# Patient Record
Sex: MALE
[De-identification: names, ages, dates, MRNs, and addresses within clinical notes are randomized per-mention and may not be internally consistent; named-entity substitution may affect disease eponyms.]

---

## 2017-07-06 ENCOUNTER — HOSPITAL ENCOUNTER (OUTPATIENT)
Dept: HOSPITAL 31 - C.ER | Age: 52
Setting detail: OBSERVATION
LOS: 1 days | Discharge: HOME | End: 2017-07-07
Attending: INTERNAL MEDICINE | Admitting: INTERNAL MEDICINE
Payer: COMMERCIAL

## 2017-07-06 VITALS — BODY MASS INDEX: 32.8 KG/M2

## 2017-07-06 DIAGNOSIS — R29.810: Primary | ICD-10-CM

## 2017-07-06 DIAGNOSIS — E11.9: ICD-10-CM

## 2017-07-06 DIAGNOSIS — Z79.82: ICD-10-CM

## 2017-07-06 DIAGNOSIS — G51.0: ICD-10-CM

## 2017-07-06 DIAGNOSIS — R20.0: ICD-10-CM

## 2017-07-06 LAB
ALBUMIN SERPL-MCNC: 4.1 G/DL (ref 3.5–5)
ALBUMIN/GLOB SERPL: 1.2 {RATIO} (ref 1–2.1)
ALT SERPL-CCNC: 39 U/L (ref 21–72)
APTT BLD: 34 SECONDS (ref 21–34)
AST SERPL-CCNC: 31 U/L (ref 17–59)
BASOPHILS # BLD AUTO: 0 K/UL (ref 0–0.2)
BASOPHILS NFR BLD: 0.6 % (ref 0–2)
BILIRUB UR-MCNC: NEGATIVE MG/DL
BUN SERPL-MCNC: 15 MG/DL (ref 9–20)
CALCIUM SERPL-MCNC: 9.1 MG/DL (ref 8.6–10.4)
CAOX CRY #/AREA URNS HPF: (no result) /HPF
EOSINOPHIL # BLD AUTO: 0.3 K/UL (ref 0–0.7)
EOSINOPHIL NFR BLD: 3.3 % (ref 0–4)
ERYTHROCYTE [DISTWIDTH] IN BLOOD BY AUTOMATED COUNT: 13.6 % (ref 11.5–14.5)
GFR NON-AFRICAN AMERICAN: > 60
GLUCOSE UR STRIP-MCNC: NORMAL MG/DL
HDLC SERPL-MCNC: 42 MG/DL (ref 30–70)
HGB BLD-MCNC: 14.4 G/DL (ref 12–18)
INR PPP: 1
LDLC SERPL-MCNC: 151 MG/DL (ref 0–129)
LEUKOCYTE ESTERASE UR-ACNC: (no result) LEU/UL
LYMPHOCYTES # BLD AUTO: 2.8 K/UL (ref 1–4.3)
LYMPHOCYTES NFR BLD AUTO: 35.2 % (ref 20–40)
MCH RBC QN AUTO: 27.7 PG (ref 27–31)
MCHC RBC AUTO-ENTMCNC: 32.4 G/DL (ref 33–37)
MCV RBC AUTO: 85.6 FL (ref 80–94)
MONOCYTES # BLD: 0.8 K/UL (ref 0–0.8)
MONOCYTES NFR BLD: 10.7 % (ref 0–10)
NEUTROPHILS # BLD: 3.9 K/UL (ref 1.8–7)
NEUTROPHILS NFR BLD AUTO: 50.2 % (ref 50–75)
NRBC BLD AUTO-RTO: 0 % (ref 0–2)
PH UR STRIP: 5 [PH] (ref 5–8)
PLATELET # BLD: 310 K/UL (ref 130–400)
PMV BLD AUTO: 6.9 FL (ref 7.2–11.7)
PROT UR STRIP-MCNC: NEGATIVE MG/DL
PROTHROMBIN TIME: 10.9 SECONDS (ref 9.7–12.2)
RBC # BLD AUTO: 5.19 MIL/UL (ref 4.4–5.9)
RBC # UR STRIP: NEGATIVE /UL
SP GR UR STRIP: 1.02 (ref 1–1.03)
SQUAMOUS EPITHIAL: 1 /HPF (ref 0–5)
URINE NITRATE: NEGATIVE
UROBILINOGEN UR-MCNC: NORMAL MG/DL (ref 0.2–1)
WBC # BLD AUTO: 7.8 K/UL (ref 4.8–10.8)

## 2017-07-06 PROCEDURE — 97161 PT EVAL LOW COMPLEX 20 MIN: CPT

## 2017-07-06 PROCEDURE — 99285 EMERGENCY DEPT VISIT HI MDM: CPT

## 2017-07-06 PROCEDURE — 85610 PROTHROMBIN TIME: CPT

## 2017-07-06 PROCEDURE — 86850 RBC ANTIBODY SCREEN: CPT

## 2017-07-06 PROCEDURE — 80053 COMPREHEN METABOLIC PANEL: CPT

## 2017-07-06 PROCEDURE — 80061 LIPID PANEL: CPT

## 2017-07-06 PROCEDURE — 97116 GAIT TRAINING THERAPY: CPT

## 2017-07-06 PROCEDURE — 86900 BLOOD TYPING SEROLOGIC ABO: CPT

## 2017-07-06 PROCEDURE — 81001 URINALYSIS AUTO W/SCOPE: CPT

## 2017-07-06 PROCEDURE — 92523 SPEECH SOUND LANG COMPREHEN: CPT

## 2017-07-06 PROCEDURE — 70450 CT HEAD/BRAIN W/O DYE: CPT

## 2017-07-06 PROCEDURE — 85025 COMPLETE CBC W/AUTO DIFF WBC: CPT

## 2017-07-06 PROCEDURE — 84484 ASSAY OF TROPONIN QUANT: CPT

## 2017-07-06 PROCEDURE — 83036 HEMOGLOBIN GLYCOSYLATED A1C: CPT

## 2017-07-06 PROCEDURE — 93005 ELECTROCARDIOGRAM TRACING: CPT

## 2017-07-06 PROCEDURE — 85730 THROMBOPLASTIN TIME PARTIAL: CPT

## 2017-07-06 PROCEDURE — 93880 EXTRACRANIAL BILAT STUDY: CPT

## 2017-07-06 PROCEDURE — 36415 COLL VENOUS BLD VENIPUNCTURE: CPT

## 2017-07-06 PROCEDURE — 71010: CPT

## 2017-07-06 PROCEDURE — 82948 REAGENT STRIP/BLOOD GLUCOSE: CPT

## 2017-07-06 NOTE — C.PDOC
History Of Present Illness





53 yo male, presnts with left sided facial droop and numbness, and left hand 

weakness, that he rpoerts he woke up at 6am with. pt denies fevers, n/v/d, ha, 

or other complaints. no h/o of cva


Time Seen by Provider: 07/06/17 15:13


Chief Complaint (Nursing): Weakness/Neurological Deficit





Past Medical History


Reviewed: Historical Data, Nursing Documentation, Vital Signs


Vital Signs: 


 Last Vital Signs











Temp  98.1 F   07/06/17 23:20


 


Pulse  75   07/06/17 23:20


 


Resp  20   07/06/17 23:20


 


BP  115/72   07/06/17 23:20


 


Pulse Ox  98   07/06/17 23:54














- Medical History


PMH: Back Problems


Family History: States: Unknown Family Hx





- Social History


Hx Tobacco Use: No


Hx Alcohol Use: Yes


Hx Substance Use: No





- Immunization History


Hx Tetanus Toxoid Vaccination: No


Hx Influenza Vaccination: No


Hx Pneumococcal Vaccination: No





Review Of Systems


Except As Marked, All Systems Reviewed And Found Negative.


Neurological: Positive for: Weakness ((+)left sided facial droop)





Physical Exam





- Physical Exam


Appears: Well, No Acute Distress


Skin: Normal Color, Warm, Dry


Eye(s): bilateral: Normal Inspection, PERRL, EOMI


Nose: Normal


Throat: Normal


Neck: Normal


Cardiovascular: Rhythm Regular


Respiratory: Normal Breath Sounds


Gastrointestinal/Abdominal: Normal Exam


Back: Normal Inspection


Extremity: Normal ROM


Neurological/Psych: Oriented x3, Normal Speech, Normal Cognition, Normal 

Cranial Nerves, Normal Motor, Normal Sensation, Other ((+)left sided facial 

droop (-)forehead sparing)





ED Course And Treatment





- Laboratory Results


Result Diagrams: 


 07/06/17 15:43





 07/06/17 15:43


O2 Sat by Pulse Oximetry: 98





NIHSS Stroke Scale





- Date/Time Evaluation Performed


Date Performed: 07/06/17





- How Severe is the Stoke


Level of Consciousness: 0=Alert


LOC to Questions: 0=Both comments correct


LOC to commands: 0=Obeys both correctly


Best Gaze: 0=Normal


Visual: 0=No visual loss


Facial: 3=Complete unilateral paralysis


Motor Arm - Left: 0=No drift


Motor Arm - Right: 0=No drift


Motor Leg - Left: 0=No drift


Motor Leg - Right: 0=No drift


Limb Ataxia: 0=Absent


Sensory: 0=Normal


Best Language: 0=No aphasia


Dysarthia: 0=Normal articulation


Extinction & Inattention (Neglect): 0=Normal, no object


Score: 3


Severity Of Stroke: 1-4= Minor Stroke





rTPA Inclusion/Exclusion





- Refusal of Treatment


Patient Refused Treatment: No





- Inclusion Criteria for Altepase


Patient is 18 years or Older: Yes


The Clinical Diagnosis of Ischemic Stroke That is Causing a Potentially 

Disabling Neurological Deficit: No


Time of Onset is Well Established to be Less Than 270 Minute Before Treatment 

Would Begin: No


Risk/Benefit Discussed With Patient/Family Member Present: Yes





Medical Decision Making


Medical Decision Making: 





highly suspect bells palsy, although pt states he has left arm/hand weakness- r/

o cva vs bells





540: pt reassessed: labs head ct neg. case discussed with dr trejo. accepts to r/

o cva as pt reports left hand weakness as well. treated emprically for bells 

palsy. 





Disposition





- Disposition


Disposition: HOSPITALIZED


Disposition Time: 17:42


Condition: STABLE





- Clinical Impression


Clinical Impression: 


 Left-sided weakness








Decision To Admit





- Pt Status Changed To:


Hospital Disposition Of: Observation





- .


Bed Request Type: Telemetry


Admitting Physician: Vladimir Trejo Jr.


Patient Diagnosis: 


 Left-sided weakness

## 2017-07-06 NOTE — CP.PCM.HP
History of Present Illness





- History of Present Illness


History of Present Illness: 





Medicine Note for Dr. Ventura





CC: left sided weakness 





HPI: 52M with PMHx of Diabetes presents to the ED with left sided weakness. 

Patient reports this morning after breakfast, his friend notified him that his 

the left side of his is drooping. He felt the left side of his face was numb 

with a decrease touch -point discrimination on sensation. He tried to brush his 

teeth after and noticed he was unable to spit out the toothpaste and water. 

Denied any difficulty swallowing, denied any weakness in upper or lower 

extremities, no changes in gait. Patient reports he underwent surgery for an 

abdominal hernia repair on 06/13/17, after the surgery he noticed he started to 

have left sided upper extremity weakness. He on 3 occasions has dropped cups 

that he was holding in his left hand. Denied fever, chills, headache, chest pain

, SOB, abdominal pain, n/v/d/c or urinary symptoms. 





PMHx: Diabetes


PSHx: Ventral hernia (6/13/17


Meds: Metformin 500mg PO daily, Vit D, Omega 3


All: NKDA


SHx: Denied tobacco, alcohol or illicit drug use 


FHx: Mother: Diabetes, Father: HTN





PMD: Juanjo  





Present on Admission





- Present on Admission


Any Indicators Present on Admission: No





Review of Systems





- Constitutional


Constitutional: Headache.  absent: Chills, Daytime Sleepiness





- EENT


Eyes: absent: Blurred Vision, Change in Vision


Ears: absent: Tinnitus


Nose/Mouth/Throat: absent: Nasal Congestion, Nasal Discharge





- Cardiovascular


Cardiovascular: absent: Chest Pain, Chest Pain at Rest, Syncope





- Respiratory


Respiratory: absent: Cough, Dyspnea





- Gastrointestinal


Gastrointestinal: absent: Abdominal Pain, Nausea, Vomiting





- Genitourinary


Genitourinary: absent: Dysuria, Hematuria





- Musculoskeletal


Musculoskeletal: absent: Back Pain





- Integumentary


Integumentary: absent: Wounds





- Neurological


Neurological: Sensory Deficit.  absent: Abnormal Speech, Confusion, Dizziness, 

Numbness, Focal Weakness, Headaches, Loss of Vision, Syncope, Tingling, Tremor, 

Weakness





- Psychiatric


Psychiatric: absent: Anxiety





- Endocrine


Endocrine: absent: Polyuria





- Hematologic/Lymphatic


Hematologic: absent: Easy Bleeding, Easy Bruising





Past Patient History





- Infectious Disease


Hx of Infectious Diseases: None





- Past Social History


Smoking Status: Never Smoked





- PSYCHIATRIC


Hx Substance Use: No





- SURGICAL HISTORY


Hx Surgeries: No





- ANESTHESIA


Hx Anesthesia: No


Hx Anesthesia Reactions: No


Hx Malignant Hyperthermia: No





Meds


Allergies/Adverse Reactions: 


 Allergies











Allergy/AdvReac Type Severity Reaction Status Date / Time


 


No Known Allergies Allergy   Verified 07/06/17 14:53














Physical Exam





- Constitutional


Appears: No Acute Distress





- Head Exam


Head Exam: ATRAUMATIC, NORMAL INSPECTION, NORMOCEPHALIC





- Eye Exam


Eye Exam: EOMI, Normal appearance, PERRL


Pupil Exam: NORMAL ACCOMODATION





- ENT Exam


ENT Exam: Mucous Membranes Moist





- Neck Exam


Neck exam: Positive for: Normal Inspection





- Respiratory Exam


Respiratory Exam: Clear to Auscultation Bilateral, NORMAL BREATHING PATTERN.  

absent: Wheezes





- Cardiovascular Exam


Cardiovascular Exam: REGULAR RHYTHM, RRR, +S1, +S2





- GI/Abdominal Exam


GI & Abdominal Exam: Normal Bowel Sounds, Soft





- Extremities Exam


Extremities exam: Positive for: normal inspection.  Negative for: pedal edema, 

tenderness, pedal pulses present





- Neurological Exam


Neurological exam: Alert, CN II-XII Intact, Normal Gait, Oriented x3, Reflexes 

Normal


Additional comments: 





left sided residual facial droop, normal facial expressions, decrease in 

sensation to light and sharp on left lower face, normal finger to nose test, 

normal rapid alternating movements, muscle strength +5 Bilateral UE, Left lower 

extremity +4/5, right lower extremity +5/5, normal heel to toe, normal gait,





- Skin


Skin Exam: Dry, Intact, Normal Color, Warm





Results





- Vital Signs


Recent Vital Signs: 





 Last Vital Signs











Temp  97.9 F   07/06/17 15:38


 


Pulse  72   07/06/17 18:54


 


Resp  21   07/06/17 18:54


 


BP  106/53 L  07/06/17 18:54


 


Pulse Ox  97   07/06/17 18:54














- Labs


Result Diagrams: 


 07/06/17 15:43





 07/06/17 15:43





Assessment & Plan





- Assessment and Plan (Free Text)


Assessment: 





52M with PMHx of Diabetes presents to the ED with left sided weakness. Patient 

reports this morning after breakfast, his friend notified him that his the left 

side of his is drooping.


Plan: 





Left Sided Facial Droop


* Head CT w/o contrast: No acute intracranial pathology identified.


* Neurology consulted- Dr. Marcelo- help appreciated 


* ASA 81mg PO daily 





Diabetes 


* Metformin 500mg PO daily 


* F/U HgA1C





HLD


* Tri: 218, Cholesterol 213, LDL: 151, HDL 42


* Started on Crestor 10mg PO QHS





Prophylactic Measures


* GI PPX: Protonix 40mg PO daily 


* DVT PPX: SCDs, Heparin 5000 SC Q8H 


* HHD 


* Zofran PRN 


* PT Guzman Ventura,


Loreta SPENCER, PGY-1

## 2017-07-06 NOTE — CT
PROCEDURE:  CT HEAD WITHOUT CONTRAST.



HISTORY:

weakness



COMPARISON:

Noncontrast head CT performed 10/30/16 



TECHNIQUE:

Axial computed tomography images were obtained through the head/brain 

without intravenous contrast.  



Radiation dose:



Total exam DLP = 955.07 mGy-cm.



This CT exam was performed using one or more of the following dose 

reduction techniques: Automated exposure control, adjustment of the 

mA and/or kV according to patient size, and/or use of iterative 

reconstruction technique.



FINDINGS:



HEMORRHAGE:

No intracranial hemorrhage. 



BRAIN:

No mass effect or edema.  The gray-white matter differentiation 

appears intact. Please note that MRI with diffusion imaging is more 

sensitive in the detection of acute ischemic event.



VENTRICLES:

No hydrocephalus. 



CALVARIUM:

Unremarkable.



PARANASAL SINUSES:

Unremarkable as visualized. No significant inflammatory changes.



MASTOID AIR CELLS:

Unremarkable as visualized. No inflammatory changes.



OTHER FINDINGS:

Partial opacification of the left external auditory canal, likely 

cerumen.



IMPRESSION:

No acute intracranial pathology identified.

## 2017-07-06 NOTE — RAD
HISTORY:

weakness  



COMPARISON:

No prior. 



FINDINGS:



LUNGS:

Mild venous congestion.  Right hilar prominence.  Patchy increased 

markings at the left lung base. Upper lobe granulomatous changes.



PLEURA:

No significant pleural effusion identified, no pneumothorax apparent.



CARDIOVASCULAR:

Normal.



OSSEOUS STRUCTURES:

No significant abnormalities.



VISUALIZED UPPER ABDOMEN:

Normal.



OTHER FINDINGS:

None.



IMPRESSION:





Mild venous congestion.  Right hilar prominence.  Patchy increased 

markings at the left lung base. Upper lobe granulomatous changes.

## 2017-07-07 VITALS
DIASTOLIC BLOOD PRESSURE: 70 MMHG | RESPIRATION RATE: 20 BRPM | OXYGEN SATURATION: 95 % | TEMPERATURE: 98.2 F | SYSTOLIC BLOOD PRESSURE: 111 MMHG | HEART RATE: 72 BPM

## 2017-07-07 LAB
ALBUMIN SERPL-MCNC: 4.1 G/DL (ref 3.5–5)
ALBUMIN/GLOB SERPL: 1.2 {RATIO} (ref 1–2.1)
ALT SERPL-CCNC: 45 U/L (ref 21–72)
AST SERPL-CCNC: 37 U/L (ref 17–59)
BASOPHILS # BLD AUTO: 0 K/UL (ref 0–0.2)
BASOPHILS NFR BLD: 0.3 % (ref 0–2)
BUN SERPL-MCNC: 13 MG/DL (ref 9–20)
CALCIUM SERPL-MCNC: 9.5 MG/DL (ref 8.6–10.4)
EOSINOPHIL # BLD AUTO: 0 K/UL (ref 0–0.7)
EOSINOPHIL NFR BLD: 0.2 % (ref 0–4)
ERYTHROCYTE [DISTWIDTH] IN BLOOD BY AUTOMATED COUNT: 13.7 % (ref 11.5–14.5)
GFR NON-AFRICAN AMERICAN: > 60
HGB BLD-MCNC: 14.7 G/DL (ref 12–18)
LYMPHOCYTES # BLD AUTO: 2.4 K/UL (ref 1–4.3)
LYMPHOCYTES NFR BLD AUTO: 23.7 % (ref 20–40)
MCH RBC QN AUTO: 28.3 PG (ref 27–31)
MCHC RBC AUTO-ENTMCNC: 33.2 G/DL (ref 33–37)
MCV RBC AUTO: 85.2 FL (ref 80–94)
MONOCYTES # BLD: 0.6 K/UL (ref 0–0.8)
MONOCYTES NFR BLD: 5.8 % (ref 0–10)
NEUTROPHILS # BLD: 7 K/UL (ref 1.8–7)
NEUTROPHILS NFR BLD AUTO: 70 % (ref 50–75)
NRBC BLD AUTO-RTO: 0.1 % (ref 0–2)
PLATELET # BLD: 299 K/UL (ref 130–400)
PMV BLD AUTO: 7.1 FL (ref 7.2–11.7)
RBC # BLD AUTO: 5.19 MIL/UL (ref 4.4–5.9)
WBC # BLD AUTO: 10 K/UL (ref 4.8–10.8)

## 2017-07-07 NOTE — CP.PCM.DIS
Provider





- Provider


Date of Admission: 


07/06/17 17:03





Attending physician: 


Vladimir Ventura Jr, MD





Primary care physician: 





Dr. Ventura


Consults: 





Dr. Marcelo (neuro)


Time Spent in preparation of Discharge (in minutes): 45





Diagnosis





- Discharge Diagnosis


(1) Bell's palsy


Status: Acute   


Comment: see summary for details   





Hospital Course





- Lab Results


Lab Results: 


 Most Recent Lab Values











WBC  10.0 K/uL (4.8-10.8)   07/07/17  07:05    


 


RBC  5.19 Mil/uL (4.40-5.90)   07/07/17  07:05    


 


Hgb  14.7 g/dL (12.0-18.0)   07/07/17  07:05    


 


Hct  44.2 % (35.0-51.0)   07/07/17  07:05    


 


MCV  85.2 fL (80.0-94.0)   07/07/17  07:05    


 


MCH  28.3 pg (27.0-31.0)   07/07/17  07:05    


 


MCHC  33.2 g/dL (33.0-37.0)   07/07/17  07:05    


 


RDW  13.7 % (11.5-14.5)   07/07/17  07:05    


 


Plt Count  299 K/uL (130-400)   07/07/17  07:05    


 


MPV  7.1 fL (7.2-11.7)  L  07/07/17  07:05    


 


Neut % (Auto)  70.0 % (50.0-75.0)   07/07/17  07:05    


 


Lymph % (Auto)  23.7 % (20.0-40.0)   07/07/17  07:05    


 


Mono % (Auto)  5.8 % (0.0-10.0)   07/07/17  07:05    


 


Eos % (Auto)  0.2 % (0.0-4.0)   07/07/17  07:05    


 


Baso % (Auto)  0.3 % (0.0-2.0)   07/07/17  07:05    


 


Neut #  7.0 K/uL (1.8-7.0)   07/07/17  07:05    


 


Lymph #  2.4 K/uL (1.0-4.3)   07/07/17  07:05    


 


Mono #  0.6 K/uL (0.0-0.8)   07/07/17  07:05    


 


Eos #  0.0 K/uL (0.0-0.7)   07/07/17  07:05    


 


Baso #  0.0 K/uL (0.0-0.2)   07/07/17  07:05    


 


PT  10.9 SECONDS (9.7-12.2)   07/06/17  15:43    


 


INR  1.0   07/06/17  15:43    


 


APTT  34 SECONDS (21-34)   07/06/17  15:43    


 


Sodium  140 mmol/L (132-148)   07/07/17  07:05    


 


Potassium  4.3 mmol/L (3.6-5.2)   07/07/17  07:05    


 


Chloride  101 mmol/L ()   07/07/17  07:05    


 


Carbon Dioxide  27 mmol/L (22-30)   07/07/17  07:05    


 


Anion Gap  17  (10-20)   07/07/17  07:05    


 


BUN  13 mg/dL (9-20)   07/07/17  07:05    


 


Creatinine  0.8 MG/DL (0.8-1.5)   07/07/17  07:05    


 


Est GFR ( Amer)  > 60   07/07/17  07:05    


 


Est GFR (Non-Af Amer)  > 60   07/07/17  07:05    


 


POC Glucose (mg/dL)  87 mg/dL ()   07/07/17  16:11    


 


Random Glucose  106 mg/dL ()   07/07/17  07:05    


 


Hemoglobin A1c  6.4 % (4.2-6.5)   07/06/17  15:43    


 


Calcium  9.5 mg/dl (8.6-10.4)   07/07/17  07:05    


 


Total Bilirubin  0.8 mg/dL (0.2-1.3)   07/07/17  07:05    


 


AST  37 U/L (17-59)   07/07/17  07:05    


 


ALT  45 U/L (21-72)   07/07/17  07:05    


 


Alkaline Phosphatase  51 U/L ()   07/07/17  07:05    


 


Troponin I  < 0.0120 ng/mL (0.00-0.120)   07/06/17  15:43    


 


Total Protein  7.4 g/dL (6.3-8.3)   07/07/17  07:05    


 


Albumin  4.1 g/dL (3.5-5.0)   07/07/17  07:05    


 


Globulin  3.3 gm/dL (2.2-3.9)   07/07/17  07:05    


 


Albumin/Globulin Ratio  1.2  (1.0-2.1)   07/07/17  07:05    


 


Triglycerides  218 mg/dL (0-149)  H  07/06/17  15:43    


 


Cholesterol  213 mg/dL (0-199)  H  07/06/17  15:43    


 


LDL Cholesterol Direct  151 mg/dL (0-129)  H  07/06/17  15:43    


 


HDL Cholesterol  42 mg/dL (30-70)   07/06/17  15:43    


 


Urine Color  Yellow  (YELLOW)   07/06/17  15:43    


 


Urine Clarity  Clear  (Clear)   07/06/17  15:43    


 


Urine pH  5.0  (5.0-8.0)   07/06/17  15:43    


 


Ur Specific Gravity  1.021  (1.003-1.030)   07/06/17  15:43    


 


Urine Protein  Negative mg/dL (NEGATIVE)   07/06/17  15:43    


 


Urine Glucose (UA)  Normal mg/dL (Normal)   07/06/17  15:43    


 


Urine Ketones  Negative mg/dL (NEGATIVE)   07/06/17  15:43    


 


Urine Blood  Negative  (NEGATIVE)   07/06/17  15:43    


 


Urine Nitrate  Negative  (NEGATIVE)   07/06/17  15:43    


 


Urine Bilirubin  Negative  (NEGATIVE)   07/06/17  15:43    


 


Urine Urobilinogen  Normal mg/dL (0.2-1.0)   07/06/17  15:43    


 


Ur Leukocyte Esterase  Neg Maya/uL (Negative)   07/06/17  15:43    


 


Urine WBC (Auto)  1 /hpf (0-5)   07/06/17  15:43    


 


Urine RBC (Auto)  1 /hpf (0-3)   07/06/17  15:43    


 


Ur Squamous Epith Cells  1 /hpf (0-5)   07/06/17  15:43    


 


Calcium Oxalate Crystal  Rare /hpf (<OCC)   07/06/17  15:43    


 


Blood Type  O POSITIVE   07/06/17  15:43    


 


Antibody Screen  Negative   07/06/17  15:43    














- Hospital Course


Hospital Course: 


"CC: left sided weakness 





HPI: 52M with PMHx of Diabetes presents to the ED with left sided weakness. 

Patient reports this morning after breakfast, his friend notified him that his 

the left side of his is drooping. He felt the left side of his face was numb 

with a decrease touch -point discrimination on sensation. He tried to brush his 

teeth after and noticed he was unable to spit out the toothpaste and water. 

Denied any difficulty swallowing, denied any weakness in upper or lower 

extremities, no changes in gait. Patient reports he underwent surgery for an 

abdominal hernia repair on 06/13/17, after the surgery he noticed he started to 

have left sided upper extremity weakness. He on 3 occasions has dropped cups 

that he was holding in his left hand. Denied fever, chills, headache, chest pain

, SOB, abdominal pain, n/v/d/c or urinary symptoms."


In ED patient had a head CT w/o contrast: No acute intracranial pathology 

identified. Aspirin 81 mg PO daily given. Metformin continued for diabetes. 

Patient had 5/5 strength in extremities. His weakness decreased in the face. 

Patient to follow up with occupational therapy for Bell's Palsy. Patient 

discharged as per Dr. Ventura. This is a summary of the hospital course. Please 

see chart for full details.  





Discharge Exam





- Head Exam


Head Exam: ATRAUMATIC, NORMAL INSPECTION, NORMOCEPHALIC


Additional comments: 





left side of lips slightly droops





- Eye Exam


Eye Exam: EOMI, Normal appearance, PERRL


Additional comments: 





left eyelid slightly drooping





- ENT Exam


ENT Exam: Mucous Membranes Moist





- Neck Exam


Neck exam: Full Rom





- Respiratory Exam


Respiratory Exam: Clear to PA & Lateral, NORMAL BREATHING PATTERN, 

UNREMARKABLE.  absent: Rales, Rhonchi, Wheezes, Respiratory Distress, Stridor





- Cardiovascular Exam


Cardiovascular Exam: REGULAR RHYTHM, RRR





- GI/Abdominal Exam


GI & Abdominal Exam: Normal Bowel Sounds.  absent: Distended, Firm, Guarding





- Extremities Exam


Extremities exam: full ROM, normal inspection





- Back Exam


Back exam: NORMAL INSPECTION





- Neurological Exam


Neurological exam: Alert, Normal Gait, Oriented x3





- Psychiatric Exam


Psychiatric exam: Normal Affect, Normal Mood





- Skin


Skin Exam: Intact, Normal Color, Warm





Discharge Plan





- Discharge Medications


Prescriptions: 


Aspirin [Ecotrin] 81 mg PO DAILY #30 tablet.


Methylprednisolone [Medrol Dose Pack (21 tabs)] 4 mg PO DAILY #21 mg





- Follow Up Plan


Condition: STABLE


Disposition: HOME/ ROUTINE


Instructions:  Aspirin (By mouth), Methylprednisolone (By mouth), Mays Palsy (DC

), Heart Healthy Diet (DC)


Additional Instructions: 


Patient cleared for discharge per Dr. Ventura. Patient to continue home 

medications and start medications below:


Medrol Dose pack - follow package instructions


Aspirin 81 mg once daily


occupational therapy for bell's palsy. 


Return Immediately to Emergency Room if symptoms return or worsen. Instructions 

discussed with patient who understands. 


Referrals: 


Rodriguez Marcelo MD [Staff Provider] -  ([phone])


Vladimir Ventura Jr., MD [Medical Doctor] -

## 2017-07-07 NOTE — CARD
--------------- APPROVED REPORT --------------





EKG Measurement

Heart [id number]

[id number]

[id number]

[id number]

[id number]



<Conclusion>

Normal sinus rhythm

Normal ECG

## 2017-07-10 NOTE — VASCLAB
PROCEDURE:  



HISTORY:

TIA



COMPARISON:

None available. 



TECHNIQUE:

Grayscale and duplex Doppler evaluation of the cervical carotid and 

vertebral arteries were performed. The common carotid, carotid 

bifurcations and cervical Internal Carotid Artery (ICA) and proximal 

External Carotid Artery (ECA) were evaluated.  The vertebral arteries 

were evaluated for gross patency and flow direction. 



Report prepared by  Shayne Edwards, BS, RVT



FINDINGS:



RIGHT CAROTID ARTERIES:

1. Common Carotid Artery: No significant focal plaque formation of 

the right common carotid artery. Maximum Peak Systolic velocity: 92 

cm/sec: End-diastolic velocity 24 cm/sec.



2. Carotid Bifurcation:  plaque formation. Maximum Peak Systolic 

velocity: 87 cm/sec: End-diastolic velocity 23 cm/sec.  



3. Internal Carotid Artery:    Plaque description:   



3.1. Proximal Segment: Peak systolic velocity 70 cm/sec: 

End-diastolic velocity  30 cm/sec - % stenosis  0-15%



3.2. Middle Segment:    Peak systolic velocity 91 cm/sec: 

End-diastolic velocity  38  cm/sec - % stenosis 0-15%



3.3. Distal Segment:     Peak systolic velocity 69 cm/sec: 

End-diastolic velocity  27  cm/sec - % stenosis 0-15%



4. External Carotid Artery: No significant focal plaque formation. 

Peak systolic velocity 92 cm/sec



5. ICA/CCA Ratio: 1.0



LEFT CAROTID ARTERIES:

1. Common Carotid Artery: No significant focal plaque formation of 

the left common carotid artery. Maximum Peak Systolic velocity: 106 

cm/sec: End-diastolic velocity 33 cm/sec.



2. Carotid Bifurcation:  plaque formation. Maximum Peak Systolic 

velocity: 101 cm/sec: End-diastolic velocity 32 cm/sec.



3. Internal Carotid Artery:      Plaque description:  



3.1. Proximal Segment: Peak systolic velocity 81 cm/sec: 

End-diastolic velocity 32 cm/sec - % stenosis 0-15%



3.2. Middle Segment:    Peak systolic velocity 81 cm/sec: 

End-diastolic velocity 37 cm/sec - % stenosis 0-15%



3.3. Distal Segment:     Peak systolic velocity 53 cm/sec: 

End-diastolic velocity 19 cm/sec - % stenosis 0-15%



4. External Carotid Artery: No significant focal plaque formation. 

Peak systolic velocity 108 cm/sec



5. ICA/CCA Ratio: 1.0



VERTEBRAL ARTERIES:

1. Right Vertebral Artery: The right vertebral artery flow direction 

is  antegrade.



2. Left Vertebral Artery:   The left vertebral artery flow direction 

is antegrade.



OTHER FINDINGS:

1. Right Brachial Blood pressure: 142 mmHg.



2. Left Brachial Blood pressure: 140 mmHg.



IMPRESSION:

RIGHT:  Duplex scan does not suggest hemodynamically significant 

stenosis of the right extracranial carotid arteries.  



LEFT:  Duplex scan does not suggest hemodynamically significant 

stenosis of the left extracranial carotid arteries.

## 2018-02-23 ENCOUNTER — HOSPITAL ENCOUNTER (EMERGENCY)
Dept: HOSPITAL 31 - C.ER | Age: 53
Discharge: HOME | End: 2018-02-23
Payer: COMMERCIAL

## 2018-02-23 VITALS
HEART RATE: 61 BPM | RESPIRATION RATE: 18 BRPM | TEMPERATURE: 99.6 F | OXYGEN SATURATION: 97 % | DIASTOLIC BLOOD PRESSURE: 73 MMHG | SYSTOLIC BLOOD PRESSURE: 118 MMHG

## 2018-02-23 VITALS — BODY MASS INDEX: 32.8 KG/M2

## 2018-02-23 DIAGNOSIS — J32.9: Primary | ICD-10-CM

## 2018-02-23 NOTE — C.PDOC
History Of Present Illness


52 yo male w/o significant PMHx come in for evaluation of subjective fever, 

chills, bodyaches, mild headache for past 3 days associated with nasal 

congestion and productive cough with clear sputum. Pt reports, since yesterday 

, developed frontal headache, " unable to breath because of nasal congestion", 

Left earache. Otherwise, pt denies severe headache, dizziness, drooling, neck 

pain, CP, SOB, dyspnea, palpitation, wheezing, abd. pain, V/D, back pain, UTI sx

, denies known sick contact or recent  travel. Ambulate to ED for evaluation, 

not in any apparent distress.


Time Seen by Provider: 02/23/18 11:47


Chief Complaint (Nursing): Flu-like Symptoms


History Per: Patient


Onset/Duration Of Symptoms: Gradual





Past Medical History


Reviewed: Historical Data, Nursing Documentation, Vital Signs


Vital Signs: 


 Last Vital Signs











Temp  99.6 F   02/23/18 11:42


 


Pulse  61   02/23/18 11:42


 


Resp  18   02/23/18 11:42


 


BP  118/73   02/23/18 11:42


 


Pulse Ox  97   02/23/18 12:07














- Medical History


PMH: Back Problems


Surgical History: No Surg Hx


Family History: States: No Known Family Hx





- Social History


Hx Tobacco Use: No


Hx Alcohol Use: Yes


Hx Substance Use: No





- Immunization History


Hx Tetanus Toxoid Vaccination: No


Hx Influenza Vaccination: No


Hx Pneumococcal Vaccination: No





Review Of Systems


Except As Marked, All Systems Reviewed And Found Negative.


Constitutional: Positive for: Fever, Chills, Malaise


ENT: Positive for: Ear Pain, Nose Discharge, Nose Congestion, Throat Pain, 

Throat Swelling.  Negative for: Ear Discharge


Cardiovascular: Negative for: Chest Pain, Palpitations, Edema, Light Headedness


Respiratory: Positive for: Cough.  Negative for: Shortness of Breath, Wheezing


Gastrointestinal: Negative for: Nausea, Vomiting, Abdominal Pain, Diarrhea


Genitourinary: Negative for: Dysuria, Frequency, Incontinence


Musculoskeletal: Negative for: Neck Pain, Back Pain


Skin: Negative for: Rash


Neurological: Negative for: Altered Mental Status, Headache, Dizziness





Physical Exam





- Physical Exam


Appears: Well, Non-toxic, No Acute Distress


Skin: Normal Color, Warm, Dry, No Rash


Head: Normacephalic


Eye(s): bilateral: PERRL


Ear(s): Left: Other (mild ear canal erythema, cerumen, unabe to visualise TM), 

Right: Normal


Nose: No Flaring, Discharge (scant clear rhinorrhea B/L), Other (mild b/L 

maxillary and frontal tenderness. No edema, no erythema.)


Oral Mucosa: Moist, No Drooling


Tongue: Normal Appearing


Lips: Normal Appearing


Throat: Erythema (mod B/L), No Exudate, No Drooling, Other (uvula midline)


Neck: Trachea Midline, Supple, Other ((-) meningeal sign)


Cardiovascular: Rhythm Regular


Respiratory: No Decreased Breath Sounds, No Accessory Muscle Use, No Stridor, 

No Wheezing


Gastrointestinal/Abdominal: Soft, No Tenderness, No Distention, No Guarding


Extremity: Normal ROM, No Deformity, No Swelling


Neurological/Psych: Oriented x3, Normal Speech





ED Course And Treatment


O2 Sat by Pulse Oximetry: 97


Pulse Ox Interpretation: Normal


Progress Note: On re-eval, pt is Afebrile, hemodynamicaly stable.  Non-toxic, 

tolerate Po well in ED.  PUlseOx 97% RA.  neck: Supple.  ENT: exam c/w 

sinusitis.  Lungs: CTA B/L, BS equal B/L.  Abd: benign.  Back: (-) CVA 

tenderness.  Neurologicaly intact.  Pt advised on course of ds.  ref. to f/u 

with PMD in 1-2 days for re-eavl.  return to ED if any worsening or new changes.





Disposition


Counseled Patient/Family Regarding: Studies Performed, Diagnosis, Need For 

Followup, Rx Given





- Disposition


Referrals: 


Radha Marshall [Staff Provider] - 


Disposition: HOME/ ROUTINE


Disposition Time: 12:31


Condition: STABLE


Additional Instructions: 


ENCOURAGE FLUIDS


TYLENOL AND/OR IBUPROFEN FOR PAIN AND FEVER


TAKE MEDICATION AS PRESCRIBED


FOLLOW UP WITH PMD, ENT IN 2-3 DAYS FOR RE-EVALUATION.


RETURN TO ED IF ANY WORSENING OR NEW CHANGES.


Prescriptions: 


Amoxicillin/Clavulanate [Augmentin 875 MG-125 MG] 1 tab PO BID #14 tab


Benzonatate [Tessalon Perle] 100 mg PO TID #14 capsule


Prednisone [Deltasone] 40 mg PO DAILY #6 tablet


Instructions:  Sinusitis in Adults


Forms:  [facility] (English), Work Excuse


Print Language: Icelandic





- Clinical Impression


Clinical Impression: 


 Sinusitis

## 2019-04-05 ENCOUNTER — HOSPITAL ENCOUNTER (INPATIENT)
Dept: HOSPITAL 31 - C.ER | Age: 54
LOS: 4 days | Discharge: HOME | DRG: 392 | End: 2019-04-09
Attending: INTERNAL MEDICINE | Admitting: INTERNAL MEDICINE
Payer: COMMERCIAL

## 2019-04-05 VITALS — BODY MASS INDEX: 32.8 KG/M2

## 2019-04-05 DIAGNOSIS — Z87.11: ICD-10-CM

## 2019-04-05 DIAGNOSIS — K52.9: Primary | ICD-10-CM

## 2019-04-05 DIAGNOSIS — K21.0: ICD-10-CM

## 2019-04-05 DIAGNOSIS — G43.919: ICD-10-CM

## 2019-04-05 DIAGNOSIS — K29.00: ICD-10-CM

## 2019-04-05 DIAGNOSIS — K57.90: ICD-10-CM

## 2019-04-05 DIAGNOSIS — K64.4: ICD-10-CM

## 2019-04-05 DIAGNOSIS — E11.9: ICD-10-CM

## 2019-04-05 DIAGNOSIS — M54.5: ICD-10-CM

## 2019-04-05 DIAGNOSIS — E86.0: ICD-10-CM

## 2019-04-05 DIAGNOSIS — G89.29: ICD-10-CM

## 2019-04-05 DIAGNOSIS — K64.8: ICD-10-CM

## 2019-04-05 DIAGNOSIS — Z79.899: ICD-10-CM

## 2019-04-05 DIAGNOSIS — B96.81: ICD-10-CM

## 2019-04-05 LAB
ALBUMIN SERPL-MCNC: 4.8 G/DL (ref 3.5–5)
ALBUMIN/GLOB SERPL: 1.7 {RATIO} (ref 1–2.1)
ALT SERPL-CCNC: 26 U/L (ref 21–72)
AST SERPL-CCNC: 40 U/L (ref 17–59)
BASOPHILS # BLD AUTO: 0 K/UL (ref 0–0.2)
BASOPHILS NFR BLD: 0.1 % (ref 0–2)
BILIRUB UR-MCNC: NEGATIVE MG/DL
BUN SERPL-MCNC: 12 MG/DL (ref 9–20)
CALCIUM SERPL-MCNC: 9.3 MG/DL (ref 8.6–10.4)
EOSINOPHIL # BLD AUTO: 0.1 K/UL (ref 0–0.7)
EOSINOPHIL NFR BLD: 0.5 % (ref 0–4)
EOSINOPHIL NFR BLD: 1 % (ref 0–4)
ERYTHROCYTE [DISTWIDTH] IN BLOOD BY AUTOMATED COUNT: 13.8 % (ref 11.5–14.5)
GFR NON-AFRICAN AMERICAN: > 60
GLUCOSE UR STRIP-MCNC: NORMAL MG/DL
HGB BLD-MCNC: 16.3 G/DL (ref 12–18)
LEUKOCYTE ESTERASE UR-ACNC: (no result) LEU/UL
LIPASE: 104 U/L (ref 23–300)
LYMPHOCYTE: 8 % (ref 20–40)
LYMPHOCYTES # BLD AUTO: 1 K/UL (ref 1–4.3)
LYMPHOCYTES NFR BLD AUTO: 8.5 % (ref 20–40)
MCH RBC QN AUTO: 28.7 PG (ref 27–31)
MCHC RBC AUTO-ENTMCNC: 32.8 G/DL (ref 33–37)
MCV RBC AUTO: 87.4 FL (ref 80–94)
MONOCYTE: 4 % (ref 0–10)
MONOCYTES # BLD: 0.7 K/UL (ref 0–0.8)
MONOCYTES NFR BLD: 5.4 % (ref 0–10)
NEUTROPHILS # BLD: 10.3 K/UL (ref 1.8–7)
NEUTROPHILS NFR BLD AUTO: 85.5 % (ref 50–75)
NEUTROPHILS NFR BLD AUTO: 87 % (ref 50–75)
NRBC BLD AUTO-RTO: 0.1 % (ref 0–2)
PH UR STRIP: 8 [PH] (ref 5–8)
PLATELET # BLD EST: NORMAL 10*3/UL
PLATELET # BLD: 309 K/UL (ref 130–400)
PMV BLD AUTO: 7.2 FL (ref 7.2–11.7)
PROT UR STRIP-MCNC: (no result) MG/DL
RBC # BLD AUTO: 5.69 MIL/UL (ref 4.4–5.9)
RBC # UR STRIP: NEGATIVE /UL
SP GR UR STRIP: 1.02 (ref 1–1.03)
SQUAMOUS EPITHIAL: 5 /HPF (ref 0–5)
TOTAL CELLS COUNTED BLD: 100
UROBILINOGEN UR-MCNC: NORMAL MG/DL (ref 0.2–1)
WBC # BLD AUTO: 12.1 K/UL (ref 4.8–10.8)

## 2019-04-05 RX ADMIN — WATER SCH MLS/HR: 1 INJECTION INTRAMUSCULAR; INTRAVENOUS; SUBCUTANEOUS at 21:20

## 2019-04-05 NOTE — CP.PCM.HP
Present on Admission





- Present on Admission


Any Indicators Present on Admission: No





Past Patient History





- Infectious Disease


Hx of Infectious Diseases: None





- Past Medical History & Family History


Past Medical History?: Yes





- Past Social History


Smoking Status: Never Smoked





- ENDOCRINE/METABOLIC


Hx Diabetes Mellitus Type 2: Yes





- MUSCULOSKELETAL/RHEUMATOLOGICAL


Hx Back Pain: Yes


Hx Falls: No





- PSYCHIATRIC


Hx Substance Use: No





- SURGICAL HISTORY


Hx Surgeries: Yes


Hx Herniorrhaphy: Yes (X1)





- ANESTHESIA


Hx Anesthesia: Yes


Hx Anesthesia Reactions: No


Hx Malignant Hyperthermia: No





Meds


Allergies/Adverse Reactions: 


                                    Allergies











Allergy/AdvReac Type Severity Reaction Status Date / Time


 


No Known Allergies Allergy   Verified 04/05/19 09:57














Results





- Vital Signs


Recent Vital Signs: 





                                Last Vital Signs











Temp  98.6 F   04/05/19 17:22


 


Pulse  76   04/05/19 17:22


 


Resp  18   04/05/19 17:22


 


BP  112/74   04/05/19 17:22


 


Pulse Ox  100   04/05/19 18:48














- Labs


Result Diagrams: 


                                 04/05/19 11:05





                                 04/05/19 11:05


Labs: 





                         Laboratory Results - last 24 hr











  04/05/19 04/05/19 04/05/19





  11:05 11:05 11:05


 


WBC  12.1 H  


 


RBC  5.69  


 


Hgb  16.3  


 


Hct  49.7  


 


MCV  87.4  D  


 


MCH  28.7  


 


MCHC  32.8 L  


 


RDW  13.8  


 


Plt Count  309  


 


MPV  7.2  


 


Neut % (Auto)  85.5 H  


 


Lymph % (Auto)  8.5 L  


 


Mono % (Auto)  5.4  


 


Eos % (Auto)  0.5  


 


Baso % (Auto)  0.1  


 


Neut # (Auto)  10.3 H  


 


Lymph # (Auto)  1.0  


 


Mono # (Auto)  0.7  


 


Eos # (Auto)  0.1  


 


Baso # (Auto)  0.0  


 


Neutrophils % (Manual)  87 H  


 


Lymphocytes % (Manual)  8 L  


 


Monocytes % (Manual)  4  


 


Eosinophils % (Manual)  1  


 


Platelet Estimate  Normal  


 


Sodium   134 


 


Potassium   4.8 


 


Chloride   98 


 


Carbon Dioxide   28 


 


Anion Gap   13 


 


BUN   12 


 


Creatinine   1.2 


 


Est GFR ( Amer)   > 60 


 


Est GFR (Non-Af Amer)   > 60 


 


Random Glucose   101 


 


Calcium   9.3 


 


Total Bilirubin   0.8 


 


AST   40 


 


ALT   26 


 


Alkaline Phosphatase   74 


 


Total Protein   7.6 


 


Albumin   4.8 


 


Globulin   2.8 


 


Albumin/Globulin Ratio   1.7 


 


Lipase   104 


 


Urine Color    Yellow


 


Urine Clarity    Hazy


 


Urine pH    8.0


 


Ur Specific Gravity    1.021


 


Urine Protein    1+ H


 


Urine Glucose (UA)    Normal


 


Urine Ketones    1+ H


 


Urine Blood    Negative


 


Urine Nitrate    Negative


 


Urine Bilirubin    Negative


 


Urine Urobilinogen    Normal


 


Ur Leukocyte Esterase    Neg


 


Urine WBC (Auto)    2


 


Urine RBC (Auto)    < 1


 


Ur Squamous Epith Cells    5

## 2019-04-05 NOTE — C.PDOC
History Of Present Illness


54 year old male presents to the ED for evaluation of abdominal pain, nausea, 

vomiting and diarrhea which has been intermittent for two months. Patient states

his symptoms worsened yesterday, prompting visit. Patient denies fever, chills, 

back pain, or any urinary complaints at this time.  


Time Seen by Provider: 04/05/19 11:11


Chief Complaint (Nursing): Abdominal Pain


History Per: Patient


History/Exam Limitations: no limitations


Onset/Duration Of Symptoms: Intermittent Episodes, Other (two months )


Current Symptoms Are (Timing): Worse


Location Of Pain/Discomfort: Diffuse


Quality Of Discomfort: "Pain"


Associated Symptoms: Nausea, Vomiting, Diarrhea.  denies: Fever, Chills, Back 

Pain





Past Medical History


Reviewed: Historical Data, Nursing Documentation, Vital Signs


Vital Signs: 





                                Last Vital Signs











Temp  98.8 F   04/05/19 14:09


 


Pulse  81   04/05/19 14:09


 


Resp  18   04/05/19 14:09


 


BP  108/71   04/05/19 14:09


 


Pulse Ox  100   04/05/19 14:09














- Medical History


PMH: Back Problems


Surgical History: No Surg Hx


Family History: States: Unknown Family Hx





- Social History


Hx Tobacco Use: No


Hx Alcohol Use: No


Hx Substance Use: No





- Immunization History


Hx Tetanus Toxoid Vaccination: No


Hx Influenza Vaccination: No


Hx Pneumococcal Vaccination: No





Review Of Systems


Constitutional: Negative for: Fever, Chills


Gastrointestinal: Positive for: Nausea, Vomiting, Abdominal Pain, Diarrhea


Musculoskeletal: Negative for: Back Pain





Physical Exam





- Physical Exam


Appears: Non-toxic, No Acute Distress


Skin: Normal Color, Warm, Dry


Head: Atraumatic, Normacephalic


Eye(s): bilateral: Normal Inspection


Oral Mucosa: Moist


Neck: Supple


Chest: Symmetrical, No Deformity, No Tenderness


Cardiovascular: Rhythm Regular, No Murmur


Respiratory: Normal Breath Sounds, No Rales, No Rhonchi, No Wheezing


Gastrointestinal/Abdominal: Soft, Tenderness (mostly to lower quadrants, 

bilaterally ), No Distention, Guarding (slight ), No Rebound


Extremity: Normal ROM, Capillary Refill (less than 2 seconds )


Neurological/Psych: Oriented x3, Normal Speech, Normal Cognition





ED Course And Treatment





- Laboratory Results


Result Diagrams: 


                                 04/05/19 11:05





                                 04/05/19 11:05


Lab Results: 





                                        











Total Bilirubin  0.8 mg/dL (0.2-1.3)   04/05/19  11:05    


 


AST  40 U/L (17-59)   04/05/19  11:05    


 


ALT  26 U/L (21-72)   04/05/19  11:05    


 


Alkaline Phosphatase  74 U/L ()   04/05/19  11:05    


 


Total Protein  7.6 g/dL (6.3-8.3)   04/05/19  11:05    


 


Albumin  4.8 g/dL (3.5-5.0)   04/05/19  11:05    


 


Globulin  2.8 gm/dL (2.2-3.9)   04/05/19  11:05    


 


Albumin/Globulin Ratio  1.7  (1.0-2.1)   04/05/19  11:05    








                                        











Lipase  104 U/L ()   04/05/19  11:05    








                                        











Urine Color  Yellow  (YELLOW)   04/05/19  11:05    


 


Urine Clarity  Hazy  (Clear)   04/05/19  11:05    


 


Urine pH  8.0  (5.0-8.0)   04/05/19  11:05    


 


Ur Specific Gravity  1.021  (1.003-1.030)   04/05/19  11:05    


 


Urine Protein  1+ mg/dL (NEGATIVE)  H  04/05/19  11:05    


 


Urine Glucose (UA)  Normal mg/dL (Normal)   04/05/19  11:05    


 


Urine Ketones  1+ mg/dL (NEGATIVE)  H  04/05/19  11:05    


 


Urine Blood  Negative  (NEGATIVE)   04/05/19  11:05    


 


Urine Nitrate  Negative  (NEGATIVE)   04/05/19  11:05    


 


Urine Bilirubin  Negative  (NEGATIVE)   04/05/19  11:05    


 


Urine Urobilinogen  Normal mg/dL (0.2-1.0)   04/05/19  11:05    


 


Ur Leukocyte Esterase  Neg Maya/uL (Negative)   04/05/19  11:05    


 


Urine WBC (Auto)  2 /hpf (0-5)   04/05/19  11:05    


 


Urine RBC (Auto)  < 1 /hpf (0-3)   04/05/19  11:05    


 


Ur Squamous Epith Cells  5 /hpf (0-5)   04/05/19  11:05    











O2 Sat by Pulse Oximetry: 100 (on RA)


Pulse Ox Interpretation: Normal





- CT Scan/US


  ** Ct of abdomen/pelvis


Other Rad Studies (CT/US): Read By Radiologist, Radiology Report Reviewed


CT/US Interpretation: Accession No. : [id number].  Patient Name / ID : CHANDU DEL ROSARIO  / [id number].  Exam Date : 04/05/2019 14:44:01 ( Approved ).  Study

Comment :  Sex / Age : M  / 54Y.  Creator : Yudi Ramirez.  Dictator : Darion Livingston MD.   :  Approver : Darion Fernando MD.  Approver2

:  Report Date : 04/05/2019 14:56:09.  My Comment :  

******************************************************************

*****************.  Date of service:  04/05/2019.  PROCEDURE:  CT Abdomen and 

Pelvis with contrast.  HISTORY:  abd pain, vomiting.  COMPARISON:  4/14/2014.  

TECHNIQUE:  Contrast dose: 100 mL Visipaque 320.  Radiation dose:  Total exam 

DLP = 1274.99 mGy-cm.  This CT exam was performed using one or more of the 

following dose reduction techniques: Automated exposure control, adjustment of 

the mA and/or kV according to patient size, and/or use of iterative 

reconstruction technique.  FINDINGS:  LOWER THORAX:  Unremarkable.  LIVER:  

Unremarkable. No gross lesion or ductal dilatation.  GALLBLADDER AND BILE DUCTS:

 Unremarkable.  PANCREAS:  Unremarkable. No gross lesion or ductal dilatation.  

SPLEEN:  Unremarkable.  ADRENALS:  Unremarkable. No mass.  KIDNEYS AND URETERS: 

Unremarkable. No hydronephrosis. No solid mass.  VASCULATURE:  Unremarkable. No 

aortic aneurysm. There is atherosclerotic calcification of the abdominal aorta. 

BOWEL:  There is circumferential mural thickening of the ascending, transverse 

and proximal descending colon consistent with nonspecific colitis.  There is no 

bowel obstruction.  There are no other abnormal bowel loops identified.  

APPENDIX:  Normal appendix.  PERITONEUM:  Unremarkable. No free fluid. No free 

air.  LYMPH NODES:  Unremarkable. No enlarged lymph nodes.  BLADDER:  

Unremarkable.  REPRODUCTIVE:  Normal prostate.  BONES:  No acute fracture.  

OTHER FINDINGS:  None.  IMPRESSION:  Nonspecific colitis involving the 

ascending, transverse and proximal descending colon.  Otherwise unremarkable 

examination.


Progress Note: Bloodwork, urinalysis, CT A/P ordered and reviewed.  Protonix 

IVP, Protonix IVP, Zofran IVP, and IV Fluids given.  Ct pos for Colitis. Cipro 

and Flaggyl IV started. case was d/w  who accepted patient to [facility] for 

observation and IV abx.





Disposition





- Disposition


Disposition: HOSPITALIZED


Disposition Time: 16:42


Condition: FAIR





- Clinical Impression


Clinical Impression: 


 Colitis








- PA / NP / Resident Statement


MD/DO has reviewed & agrees with the documentation as recorded.





- Scribe Statement


The provider has reviewed the documentation as recorded by the Scribe (Paula Vidal)








All medical record entries made by the Scribe were at my direction and 

personally dictated by me. I have reviewed the chart and agree that the record 

accurately reflects my personal performance of the history, physical exam, 

medical decision making, and the department course for this patient. I have also

personally directed, reviewed, and agree with the discharge instructions and 

disposition.





Decision To Admit





- Pt Status Changed To:


Hospital Disposition Of: Observation





- .


Bed Request Type: Regular


Admitting Physician: Edil Burgess


Patient Diagnosis: 


 Colitis

## 2019-04-05 NOTE — CT
Date of service: 



04/05/2019



PROCEDURE:  CT Abdomen and Pelvis with contrast



HISTORY:

abd pain, vomiting



COMPARISON:

4/14/2014



TECHNIQUE:

Contrast dose: 100 mL Visipaque 320



Radiation dose:



Total exam DLP = 1274.99 mGy-cm.



This CT exam was performed using one or more of the following dose 

reduction techniques: Automated exposure control, adjustment of the 

mA and/or kV according to patient size, and/or use of iterative 

reconstruction technique.



FINDINGS:



LOWER THORAX:

Unremarkable. 



LIVER:

Unremarkable. No gross lesion or ductal dilatation. 



GALLBLADDER AND BILE DUCTS:

Unremarkable. 



PANCREAS:

Unremarkable. No gross lesion or ductal dilatation.



SPLEEN:

Unremarkable. 



ADRENALS:

Unremarkable. No mass. 



KIDNEYS AND URETERS:

Unremarkable. No hydronephrosis. No solid mass. 



VASCULATURE:

Unremarkable. No aortic aneurysm. There is atherosclerotic 

calcification of the abdominal aorta.



BOWEL:

There is circumferential mural thickening of the ascending, 

transverse and proximal descending colon consistent with nonspecific 

colitis.  There is no bowel obstruction.  There are no other abnormal 

bowel loops identified.  



APPENDIX:

Normal appendix. 



PERITONEUM:

Unremarkable. No free fluid. No free air. 



LYMPH NODES:

Unremarkable. No enlarged lymph nodes. 



BLADDER:

Unremarkable. 



REPRODUCTIVE:

Normal prostate 



BONES:

No acute fracture. 



OTHER FINDINGS:

None.



IMPRESSION:

Nonspecific colitis involving the ascending, transverse and proximal 

descending colon.  Otherwise unremarkable examination.

## 2019-04-06 LAB
APTT BLD: 35 SECONDS (ref 21–34)
INR PPP: 1.2
PROTHROMBIN TIME: 12.6 SECONDS (ref 9.7–12.2)

## 2019-04-06 RX ADMIN — CIPROFLOXACIN SCH MLS/HR: 2 INJECTION, SOLUTION INTRAVENOUS at 17:01

## 2019-04-06 RX ADMIN — WATER SCH MLS/HR: 1 INJECTION INTRAMUSCULAR; INTRAVENOUS; SUBCUTANEOUS at 19:15

## 2019-04-06 RX ADMIN — WATER SCH MLS/HR: 1 INJECTION INTRAMUSCULAR; INTRAVENOUS; SUBCUTANEOUS at 12:47

## 2019-04-06 RX ADMIN — ENOXAPARIN SODIUM SCH MG: 40 INJECTION SUBCUTANEOUS at 09:12

## 2019-04-06 RX ADMIN — WATER SCH MLS/HR: 1 INJECTION INTRAMUSCULAR; INTRAVENOUS; SUBCUTANEOUS at 03:00

## 2019-04-06 RX ADMIN — PANTOPRAZOLE SODIUM SCH MG: 40 TABLET, DELAYED RELEASE ORAL at 09:12

## 2019-04-06 RX ADMIN — CIPROFLOXACIN SCH MLS/HR: 2 INJECTION, SOLUTION INTRAVENOUS at 05:00

## 2019-04-06 NOTE — HP
CHIEF COMPLAINT:  Abdominal pain x2 months.



HISTORY OF PRESENT ILLNESS:  This is a 54-year-old  male with no

significant past medical history.  No prior abdominal surgery.  No history

of recent travel.  He came in because of abdominal pain, nausea, vomiting,

diarrhea intermittently for about 2 months.  He did not feel well.  He did

seek medical attention, but no response and he continues to have diarrhea. 

He is getting weak, tired, fatigued.  He also has some abdominal cramps. 

He has generalized weakness, fever, chills, rigors, body aches, headache. 

He denies any dizziness.  He denies any history of polyuria, polydipsia,

polyphagia.  He denies any history of hematuria, pyuria.  He denies any

history of sneezing.  He denies any history of trauma or loss of

consciousness.  There is no history of cold, sore throat, runny nose.  He

denies any history of antibiotics recently.



ALLERGIES:  UNKNOWN.



CURRENT MEDICATIONS:  None.



PAST MEDICAL HISTORY:  Nothing significant.



SOCIAL HISTORY:  Nonsmoker, social [employer] user.



PHYSICAL EXAMINATION:

GENERAL:  A middle-aged male, in no acute distress.

VITAL SIGNS:  Blood pressure 112/74, pulse 76, respiratory rate 18,

temperature 98.6.

SKIN:  Warm.  Good turgor.  No bruises.  No purpura.  No petechiae.

HEENT:  Atraumatic, normocephalic.  Negative pallor.  Negative jaundice. 

Extraocular movements are intact.

NECK:  Supple.  No JVD.

CHEST WALL:  Bilateral symmetrical expansion.

LUNGS:  Clear.  No rales.  No rhonchi.

CARDIOVASCULAR SYSTEM:  S1, S2, regular.

ABDOMEN:  Soft, nontender.  Bowel sounds are positive.

EXTREMITIES:  No clubbing, cyanosis, or edema.

CENTRAL NERVOUS SYSTEM:  Awake, alert and oriented x3.



ASSESSMENT:

1.  Acute colitis, rule out bacterial versus viral versus _____.

2.  Dehydration.



PLAN:  Admit.  Detailed orders are written.  Stool cultures.  GI will

monitor the patient.





__________________________________________

Edil Burgess MD





DD:  04/05/2019 23:20:24

DT:  04/06/2019 0:29:24

Job # [id number]

## 2019-04-06 NOTE — CT
Date of service: 



04/06/2019



PROCEDURE:  CT HEAD WITHOUT CONTRAST.



HISTORY:

headcahe



COMPARISON:

Comparison is made to the previous study dated 07/06/2017



TECHNIQUE:

Axial computed tomography images were obtained through the head/brain 

without intravenous contrast.  



Radiation dose:



Total exam DLP = 1201.84 mGy-cm.



This CT exam was performed using one or more of the following dose 

reduction techniques: Automated exposure control, adjustment of the 

mA and/or kV according to patient size, and/or use of iterative 

reconstruction technique.



FINDINGS:



HEMORRHAGE:

No intracranial hemorrhage. 



BRAIN:

No mass effect or edema.  No atrophy or chronic microvascular 

ischemic changes.



VENTRICLES:

Unremarkable. No hydrocephalus. 



CALVARIUM:

Unremarkable.



PARANASAL SINUSES:

Unremarkable as visualized. No significant inflammatory changes.



MASTOID AIR CELLS:

Unremarkable as visualized. No inflammatory changes.



OTHER FINDINGS:

None.



IMPRESSION:

No evidence of acute intracranial hemorrhage intracranial collection 

mass effect or midline shift.

## 2019-04-07 RX ADMIN — ENOXAPARIN SODIUM SCH MG: 40 INJECTION SUBCUTANEOUS at 09:17

## 2019-04-07 RX ADMIN — WATER SCH MLS/HR: 1 INJECTION INTRAMUSCULAR; INTRAVENOUS; SUBCUTANEOUS at 14:34

## 2019-04-07 RX ADMIN — WATER SCH MLS/HR: 1 INJECTION INTRAMUSCULAR; INTRAVENOUS; SUBCUTANEOUS at 20:22

## 2019-04-07 RX ADMIN — OXYCODONE HYDROCHLORIDE AND ACETAMINOPHEN PRN TAB: 5; 325 TABLET ORAL at 07:28

## 2019-04-07 RX ADMIN — PANTOPRAZOLE SODIUM SCH MG: 40 TABLET, DELAYED RELEASE ORAL at 09:23

## 2019-04-07 RX ADMIN — CIPROFLOXACIN SCH MLS/HR: 2 INJECTION, SOLUTION INTRAVENOUS at 17:32

## 2019-04-07 RX ADMIN — CIPROFLOXACIN SCH MLS/HR: 2 INJECTION, SOLUTION INTRAVENOUS at 06:38

## 2019-04-07 RX ADMIN — OXYCODONE HYDROCHLORIDE AND ACETAMINOPHEN PRN TAB: 5; 325 TABLET ORAL at 14:27

## 2019-04-07 RX ADMIN — WATER SCH MLS/HR: 1 INJECTION INTRAMUSCULAR; INTRAVENOUS; SUBCUTANEOUS at 05:11

## 2019-04-08 VITALS — RESPIRATION RATE: 20 BRPM

## 2019-04-08 LAB
ALBUMIN SERPL-MCNC: 3.7 G/DL (ref 3.5–5)
ALBUMIN/GLOB SERPL: 1.4 {RATIO} (ref 1–2.1)
ALT SERPL-CCNC: 29 U/L (ref 21–72)
APTT BLD: 37 SECONDS (ref 21–34)
AST SERPL-CCNC: 32 U/L (ref 17–59)
BASOPHILS # BLD AUTO: 0 K/UL (ref 0–0.2)
BASOPHILS NFR BLD: 0.3 % (ref 0–2)
BUN SERPL-MCNC: 8 MG/DL (ref 9–20)
CALCIUM SERPL-MCNC: 8.6 MG/DL (ref 8.6–10.4)
EOSINOPHIL # BLD AUTO: 0.1 K/UL (ref 0–0.7)
EOSINOPHIL NFR BLD: 0.7 % (ref 0–4)
ERYTHROCYTE [DISTWIDTH] IN BLOOD BY AUTOMATED COUNT: 13.5 % (ref 11.5–14.5)
GFR NON-AFRICAN AMERICAN: > 60
HGB BLD-MCNC: 14.1 G/DL (ref 12–18)
INR PPP: 1.3
LYMPHOCYTES # BLD AUTO: 1.6 K/UL (ref 1–4.3)
LYMPHOCYTES NFR BLD AUTO: 19.2 % (ref 20–40)
MCH RBC QN AUTO: 28.6 PG (ref 27–31)
MCHC RBC AUTO-ENTMCNC: 32.7 G/DL (ref 33–37)
MCV RBC AUTO: 87.7 FL (ref 80–94)
MONOCYTES # BLD: 0.6 K/UL (ref 0–0.8)
MONOCYTES NFR BLD: 7.4 % (ref 0–10)
NEUTROPHILS # BLD: 5.9 K/UL (ref 1.8–7)
NEUTROPHILS NFR BLD AUTO: 72.4 % (ref 50–75)
NRBC BLD AUTO-RTO: 0 % (ref 0–2)
PLATELET # BLD: 260 K/UL (ref 130–400)
PMV BLD AUTO: 6.8 FL (ref 7.2–11.7)
PROTHROMBIN TIME: 13.8 SECONDS (ref 9.7–12.2)
RBC # BLD AUTO: 4.92 MIL/UL (ref 4.4–5.9)
WBC # BLD AUTO: 8.2 K/UL (ref 4.8–10.8)

## 2019-04-08 PROCEDURE — 0DBL8ZX EXCISION OF TRANSVERSE COLON, VIA NATURAL OR ARTIFICIAL OPENING ENDOSCOPIC, DIAGNOSTIC: ICD-10-PCS | Performed by: SPECIALIST

## 2019-04-08 PROCEDURE — 0DBM8ZX EXCISION OF DESCENDING COLON, VIA NATURAL OR ARTIFICIAL OPENING ENDOSCOPIC, DIAGNOSTIC: ICD-10-PCS | Performed by: SPECIALIST

## 2019-04-08 PROCEDURE — 0DBK8ZX EXCISION OF ASCENDING COLON, VIA NATURAL OR ARTIFICIAL OPENING ENDOSCOPIC, DIAGNOSTIC: ICD-10-PCS | Performed by: SPECIALIST

## 2019-04-08 RX ADMIN — OXYCODONE HYDROCHLORIDE AND ACETAMINOPHEN PRN TAB: 5; 325 TABLET ORAL at 12:44

## 2019-04-08 RX ADMIN — CIPROFLOXACIN SCH MLS/HR: 2 INJECTION, SOLUTION INTRAVENOUS at 05:30

## 2019-04-08 RX ADMIN — PANTOPRAZOLE SODIUM SCH: 40 TABLET, DELAYED RELEASE ORAL at 10:14

## 2019-04-08 RX ADMIN — OXYCODONE HYDROCHLORIDE AND ACETAMINOPHEN PRN TAB: 5; 325 TABLET ORAL at 00:21

## 2019-04-08 RX ADMIN — CIPROFLOXACIN SCH MLS/HR: 2 INJECTION, SOLUTION INTRAVENOUS at 18:19

## 2019-04-08 RX ADMIN — ENOXAPARIN SODIUM SCH: 40 INJECTION SUBCUTANEOUS at 10:14

## 2019-04-08 RX ADMIN — WATER SCH MLS/HR: 1 INJECTION INTRAMUSCULAR; INTRAVENOUS; SUBCUTANEOUS at 05:59

## 2019-04-08 RX ADMIN — WATER SCH MLS/HR: 1 INJECTION INTRAMUSCULAR; INTRAVENOUS; SUBCUTANEOUS at 12:52

## 2019-04-08 RX ADMIN — WATER SCH MLS/HR: 1 INJECTION INTRAMUSCULAR; INTRAVENOUS; SUBCUTANEOUS at 21:00

## 2019-04-08 NOTE — CON
DATE:  04/05/2019



This is from Dr. Rona Buckley to Dr. Edil Burgess.



I was called for GI consultation by the admitting MD.  The patient seen and

fully examined on 04/05/2019 as requested by the admitting medical staff. 

The entire chart is reviewed including, but not limited to the most recent

lab and radiology study results, current and the previous medication list,

current and the previous medical events as well as allergy to medication

list.



Case discussed with the admitting medical staff and the primary MD at

length at the time of my GI consultation on 04/05/2019.



HISTORY OF PRESENT ILLNESS:  This is a 54-year-old male who was admitted to

the hospital through the emergency room due to recurrent episodes of nausea

and vomiting with persistent diarrhea on and off for the last two months

with loss of appetite, generalized weakness and malaise, but no reported

chills or fever, evidence of active GI bleeding, chest pain or palpitation

or significant shortness of breath.



PAST MEDICAL HISTORY:  Indicative of chronic lower back pain syndrome as

well as peptic ulcer disease.



FAMILY HISTORY:  Unknown.



SOCIAL HISTORY:  No reported recent history of cigarette smoking or alcohol

intake.



CURRENT MEDICATIONS:  Post admission medication list was reviewed.



ALLERGIES:  ALLERGY TO MEDICATIONS UNCLEAR.



LABORATORY DATA:  Initial blood workup post admission showed leukocytosis

of 22.1 with normal hemoglobin and hematocrit and normal SMA-7 with normal

total protein and albumin.



Abdominal and pelvic CAT scan was performed, indicative of nonspecific

colitis.



PHYSICAL EXAMINATION:

GENERAL:  A 54-year-old male, awake, alert, oriented, complaining of

abdominal pain.

VITAL SIGNS:  Afebrile with pulse of 78, respiratory rate 20 to 22, blood

pressure 116/74.

HEENT:  Pale, dry oral mucous membrane mildly, nonicteric sclerae.

LYMPH NODES:  No lymphadenitis or lymphadenopathy.

LUNGS:  Few scattered crepitation with mild decrease of air entry at bases.

HEART:  Positive S1 and S2.

ABDOMEN:  Soft with mild to moderate distention with diffuse tenderness. 

Bowel sounds are hyperactive.  No mass or organomegaly.  No rebound

tenderness or guarding.

RECTAL EXAMINATION:  The patient refused.

EXTREMITIES:  Without significant clubbing, cyanosis, or edema.

NEUROLOGICAL:  No reported new neurological deficits, sensory or motor.



IMPRESSION:

1.  Re-exacerbation of peptic ulcer disease.

2.  Abnormal CAT scan of the abdomen and pelvis, with episodes of diarrhea

indicative of possible infectious colitis versus an early stage of

inflammatory bowel disease.  To rule out pseudomembranous colitis.

3.  Chronic lower back pain syndrome.

4.  Leukocytosis, secondary to above.



SUGGESTION:

1.  Agree with your plan.

2.  Reglan IV.

3.  Proton pump inhibitors.

4.  Continue current IV antibiotics.

5.  Complete stool workup.

6.  Cancer markers including CEA.

7.  Serum lipase, amylase level.

8.  Endoscopic evaluation of the upper and lower GI tract when the patient

is more stable clinically and after adequate preparation.

9.  Further recommendations to follow.







__________________________________________

Rona Buckley MD





DD:  04/07/2019 23:30:03

DT:  04/08/2019 2:46:31

Job # [id number]

## 2019-04-08 NOTE — MRI
Date of service: 



04/08/2019



PROCEDURE:  MRI BRAIN WITH AND WITHOUT CONTRAST



HISTORY:

intractable h/a; r/o mass or lesion



COMPARISON:

None available. 



TECHNIQUE:

Multiplanar, multisequence MR images of the brain were obtained with 

and without intravenous contrast enhancement.



FINDINGS:



HEMORRHAGE:

None



DWI:

No evidence of an acute or early subacute infarction.



BRAIN PARENCHYMA:

Intrinsic signal throughout the gray and white matter structures 

above below the tentorium appears within normal limits including the 

brainstem.  There is no mass effect, parenchymal edema or loss of the 

corticomedullary differentiation. Midline brain anatomy appears 

within normal limits including the corpus callosum, brainstem and 

craniocervical junction. There is no suspicious extra-axial fluid 

collection identified.



ENHANCEMENT:

No abnormal intracranial enhancement.



VENTRICLES:

Unremarkable. No hydrocephalus.



CRANIUM:

Unremarkable.



ORBITS:

Grossly unremarkable.



PARANASAL SINUSES/MASTOIDS:

Clear



VASCULAR SYSTEM:

Skull base flow voids intact.



OTHER FINDINGS:

None .



IMPRESSION:

Unremarkable pre and post contrast enhanced MRI of the brain.



[facility] preliminary report from [facility], 04/08/2019 6:08 p.m..

## 2019-04-08 NOTE — CP.PCM.PN
Subjective





- Date & Time of Evaluation


Date of Evaluation: 04/08/19


Time of Evaluation: 07:20





- Subjective


Subjective: 





dictated   





Objective





- Vital Signs/Intake and Output


Vital Signs (last 24 hours): 


                                        











Temp Pulse Resp BP Pulse Ox


 


 98 F   72   20   116/69   98 


 


 04/08/19 15:57  04/08/19 15:57  04/08/19 15:57  04/08/19 15:57  04/08/19 15:57








Intake and Output: 


                                        











 04/08/19 04/09/19





 18:59 06:59


 


Intake Total 1060 


 


Balance 1060 














- Medications


Medications: 


                               Current Medications





Acetaminophen (Tylenol 325mg Tab)  650 mg PO Q6 PRN


   PRN Reason: Headache


   Last Admin: 04/08/19 06:06 Dose:  650 mg


Enoxaparin Sodium (Lovenox)  40 mg SC DAILY [facility]


   Last Admin: 04/08/19 10:14 Dose:  Not Given


Metronidazole (Flagyl)  500 mg in 100 mls @ 100 mls/hr IVPB Q8H [facility]; Protocol


   Last Admin: 04/08/19 21:00 Dose:  100 mls/hr


Ciprofloxacin (Cipro 400mg/200ml Dsw)  400 mg in 200 mls @ 133 mls/hr IVPB Q12H 

[facility]; Protocol


   Last Admin: 04/08/19 18:19 Dose:  133 mls/hr


Metoclopramide HCl (Reglan)  5 mg IVP Q6H [facility]


   Last Admin: 04/08/19 18:19 Dose:  5 mg


Ondansetron HCl (Zofran Inj)  4 mg IVP Q6 PRN


   PRN Reason: Nausea/Vomiting


   Last Admin: 04/07/19 14:30 Dose:  4 mg


Oxycodone/Acetaminophen (Percocet 5/325 Mg Tab)  1 tab PO Q4H PRN


   PRN Reason: Pain, severe (8-10)


   Stop: 04/09/19 18:20


   Last Admin: 04/08/19 12:44 Dose:  1 tab


Pantoprazole Sodium (Protonix Ec Tab)  40 mg PO DAILY [facility]


   Last Admin: 04/08/19 10:14 Dose:  Not Given


Sodium Chloride (Ayr Baby Saline 30 Ml)  0 ml ESTEFANY Q6H PRN


   PRN Reason: Nasal congestion


   Last Admin: 04/06/19 22:08 Dose:  1 spr


Topiramate (Topamax)  50 mg PO BID [facility]


   Last Admin: 04/08/19 18:18 Dose:  50 mg











- Labs


Labs: 


                                        





                                 04/08/19 13:40 





                                 04/08/19 13:40 





                                        











PT  13.8 SECONDS (9.7-12.2)  H  04/08/19  13:40    


 


INR  1.3   04/08/19  13:40    


 


APTT  37 SECONDS (21-34)  H  04/08/19  13:40

## 2019-04-08 NOTE — CP.PCM.PN
Subjective





- Date & Time of Evaluation


Date of Evaluation: 04/08/19


Time of Evaluation: 15:16





- Subjective


Subjective: 





Neuro Follow-Up:





Mr. Fabricio Abraham was evaluated this afternoon at bedside.  He states that at 

this time he has no h/a.  Pt admits that he gets between 20-22 h/a per month 

every month and has had this problem since he was a young male.  He has seen Dr. Garrett approx 10 years ago; was never diagnosed with migraines and never followed

up with Dr. Garrett after a few visits.  He admits to feeling fatigued, blurred 

vision, photophobia, occasional nasal drainage pre-h/a.  His h/a can last 

several hours to 24 hours.  He says they affect his work and daily life.  Takes 

Advil at home prn (has been taking excessive amounts lately) which has not been 

providing him with any relief recently.  Otherwise, ROS negative. 





Objective





- Vital Signs/Intake and Output


Vital Signs (last 24 hours): 


                                        











Temp Pulse Resp BP Pulse Ox


 


 99.3 F   63   16   115/64   100 


 


 04/08/19 11:20  04/08/19 11:50  04/08/19 11:50  04/08/19 11:50  04/08/19 11:50








Intake and Output: 


                                        











 04/08/19 04/08/19





 06:59 18:59


 


Intake Total 1150 1060


 


Balance 1150 1060














- Medications


Medications: 


                               Current Medications





Acetaminophen (Tylenol 325mg Tab)  650 mg PO Q6 PRN


   PRN Reason: Headache


   Last Admin: 04/08/19 06:06 Dose:  650 mg


Enoxaparin Sodium (Lovenox)  40 mg SC DAILY [facility]


   Last Admin: 04/08/19 10:14 Dose:  Not Given


Metronidazole (Flagyl)  500 mg in 100 mls @ 100 mls/hr IVPB Q8H [facility]; Protocol


   Last Admin: 04/08/19 12:52 Dose:  100 mls/hr


Sodium Chloride (Sodium Chloride 0.9%)  1,000 mls @ 100 mls/hr IV .Q10H [facility]


   Last Admin: 04/08/19 08:09 Dose:  Not Given


Ciprofloxacin (Cipro 400mg/200ml Dsw)  400 mg in 200 mls @ 133 mls/hr IVPB Q12H 

[facility]; Protocol


   Last Admin: 04/08/19 05:30 Dose:  133 mls/hr


Metoclopramide HCl (Reglan)  5 mg IVP Q6H [facility]


   Last Admin: 04/08/19 13:00 Dose:  Not Given


Ondansetron HCl (Zofran Inj)  4 mg IVP Q6 PRN


   PRN Reason: Nausea/Vomiting


   Last Admin: 04/07/19 14:30 Dose:  4 mg


Oxycodone/Acetaminophen (Percocet 5/325 Mg Tab)  1 tab PO Q4H PRN


   PRN Reason: Pain, severe (8-10)


   Stop: 04/09/19 18:20


   Last Admin: 04/08/19 12:44 Dose:  1 tab


Pantoprazole Sodium (Protonix Ec Tab)  40 mg PO DAILY [facility]


   Last Admin: 04/08/19 10:14 Dose:  Not Given


Sodium Chloride (Ayr Baby Saline 30 Ml)  0 ml ESTEFANY Q6H PRN


   PRN Reason: Nasal congestion


   Last Admin: 04/06/19 22:08 Dose:  1 spr


Topiramate (Topamax)  50 mg PO BID [facility]











- Labs


Labs: 


                                        





                                 04/08/19 13:40 





                                 04/08/19 13:40 





                                        











PT  13.8 SECONDS (9.7-12.2)  H  04/08/19  13:40    


 


INR  1.3   04/08/19  13:40    


 


APTT  37 SECONDS (21-34)  H  04/08/19  13:40    














- Constitutional


Appears: Well, Non-toxic, No Acute Distress





- Head Exam


Head Exam: ATRAUMATIC, NORMAL INSPECTION, NORMOCEPHALIC





- Eye Exam


Eye Exam: EOMI, Normal appearance, PERRL


Pupil Exam: NORMAL ACCOMODATION, PERRL





- ENT Exam


ENT Exam: Mucous Membranes Moist





- Neck Exam


Neck Exam: Full ROM, Normal Inspection





- Respiratory Exam


Respiratory Exam: NORMAL BREATHING PATTERN





- Extremities Exam


Extremities Exam: Full ROM, Normal Inspection.  absent: Calf Tenderness, Pedal 

Edema





- Back Exam


Back Exam: Full ROM, NORMAL INSPECTION





- Neurological Exam


Neurological Exam: Alert, Awake, CN II-XII Intact, Oriented x3, Reflexes Normal


Neuro motor strength exam: Left Upper Extremity: 5, Right Upper Extremity: 5, 

Left Lower Extremity: 5, Right Lower Extremity: 5


Additional comments: 





Speech clear, fluid


No focal motor or sensory deficits.


No tremors








- Psychiatric Exam


Psychiatric exam: Normal Affect, Normal Mood





- Skin


Skin Exam: Normal Color





Assessment and Plan


(1) Headache


Assessment & Plan: 


Imaging reviewed:





-CT Head (4/5/19): No evidence of acute intracranial hemorrhage intracranial 

collection mass effect or midline shift.





-Likely migraine h/a. 


-Brain MRI with and without contrast to r/o mass/lesion or other acute 

intracranial abnormalities--f/u with results.


-Start Topamax 50 mg PO BID


-Continue supportive care.


-Notify neuro of any acute changes.





Scarlet Stephens, LEROY, APN


d/w Dr. Sheppard


Status: Acute

## 2019-04-08 NOTE — CP.PCM.PN
Subjective





- Date & Time of Evaluation


Date of Evaluation: 04/07/19


Time of Evaluation: 07:20





- Subjective


Subjective: 





dictated   





Objective





- Vital Signs/Intake and Output


Vital Signs (last 24 hours): 


                                        











Temp Pulse Resp BP Pulse Ox


 


 98.4 F   69   20   108/56 L  95 


 


 04/07/19 16:05  04/07/19 16:05  04/07/19 16:05  04/07/19 16:05  04/07/19 16:05








Intake and Output: 


                                        











 04/07/19 04/08/19





 18:59 06:59


 


Intake Total  1150


 


Balance  1150














- Medications


Medications: 


                               Current Medications





Acetaminophen (Tylenol 325mg Tab)  650 mg PO Q6 PRN


   PRN Reason: Headache


   Last Admin: 04/06/19 12:48 Dose:  650 mg


Enoxaparin Sodium (Lovenox)  40 mg SC DAILY [facility]


   Last Admin: 04/07/19 09:17 Dose:  40 mg


Metronidazole (Flagyl)  500 mg in 100 mls @ 100 mls/hr IVPB Q8H [facility]; Protocol


   Last Admin: 04/07/19 20:22 Dose:  100 mls/hr


Sodium Chloride (Sodium Chloride 0.9%)  1,000 mls @ 100 mls/hr IV .Q10H [facility]


   Last Admin: 04/07/19 14:35 Dose:  Not Given


Ciprofloxacin (Cipro 400mg/200ml Dsw)  400 mg in 200 mls @ 133 mls/hr IVPB Q12H 

[facility]; Protocol


   Last Admin: 04/07/19 17:32 Dose:  133 mls/hr


Metoclopramide HCl (Reglan)  5 mg IVP Q6H [facility]


   Last Admin: 04/08/19 00:21 Dose:  5 mg


Ondansetron HCl (Zofran Inj)  4 mg IVP Q6 PRN


   PRN Reason: Nausea/Vomiting


   Last Admin: 04/07/19 14:30 Dose:  4 mg


Oxycodone/Acetaminophen (Percocet 5/325 Mg Tab)  1 tab PO Q4H PRN


   PRN Reason: Pain, severe (8-10)


   Stop: 04/09/19 18:20


   Last Admin: 04/08/19 00:21 Dose:  1 tab


Pantoprazole Sodium (Protonix Ec Tab)  40 mg PO DAILY [facility]


   Last Admin: 04/07/19 09:23 Dose:  40 mg


Sodium Chloride (Ayr Baby Saline 30 Ml)  0 ml ESTEFANY Q6H PRN


   PRN Reason: Nasal congestion


   Last Admin: 04/06/19 22:08 Dose:  1 spr











- Labs


Labs: 


                                        





                                 04/05/19 11:05 





                                 04/05/19 11:05 





                                        











PT  12.6 SECONDS (9.7-12.2)  H  04/06/19  11:25    


 


INR  1.2   04/06/19  11:25    


 


APTT  35 SECONDS (21-34)  H  04/06/19  11:25

## 2019-04-08 NOTE — CP.PCM.PN
Subjective





- Date & Time of Evaluation


Date of Evaluation: 04/06/19


Time of Evaluation: 07:20





- Subjective


Subjective: 





dict   





Objective





- Vital Signs/Intake and Output


Vital Signs (last 24 hours): 


                                        











Temp Pulse Resp BP Pulse Ox


 


 98.4 F   69   20   108/56 L  95 


 


 04/07/19 16:05  04/07/19 16:05  04/07/19 16:05  04/07/19 16:05  04/07/19 16:05








Intake and Output: 


                                        











 04/07/19 04/08/19





 18:59 06:59


 


Intake Total  1150


 


Balance  1150














- Medications


Medications: 


                               Current Medications





Acetaminophen (Tylenol 325mg Tab)  650 mg PO Q6 PRN


   PRN Reason: Headache


   Last Admin: 04/06/19 12:48 Dose:  650 mg


Enoxaparin Sodium (Lovenox)  40 mg SC DAILY [facility]


   Last Admin: 04/07/19 09:17 Dose:  40 mg


Metronidazole (Flagyl)  500 mg in 100 mls @ 100 mls/hr IVPB Q8H [facility]; Protocol


   Last Admin: 04/07/19 20:22 Dose:  100 mls/hr


Sodium Chloride (Sodium Chloride 0.9%)  1,000 mls @ 100 mls/hr IV .Q10H [facility]


   Last Admin: 04/07/19 14:35 Dose:  Not Given


Ciprofloxacin (Cipro 400mg/200ml Dsw)  400 mg in 200 mls @ 133 mls/hr IVPB Q12H 

[facility]; Protocol


   Last Admin: 04/07/19 17:32 Dose:  133 mls/hr


Metoclopramide HCl (Reglan)  5 mg IVP Q6H [facility]


   Last Admin: 04/08/19 00:21 Dose:  5 mg


Ondansetron HCl (Zofran Inj)  4 mg IVP Q6 PRN


   PRN Reason: Nausea/Vomiting


   Last Admin: 04/07/19 14:30 Dose:  4 mg


Oxycodone/Acetaminophen (Percocet 5/325 Mg Tab)  1 tab PO Q4H PRN


   PRN Reason: Pain, severe (8-10)


   Stop: 04/09/19 18:20


   Last Admin: 04/08/19 00:21 Dose:  1 tab


Pantoprazole Sodium (Protonix Ec Tab)  40 mg PO DAILY [facility]


   Last Admin: 04/07/19 09:23 Dose:  40 mg


Sodium Chloride (Ayr Baby Saline 30 Ml)  0 ml ESTEFANY Q6H PRN


   PRN Reason: Nasal congestion


   Last Admin: 04/06/19 22:08 Dose:  1 spr











- Labs


Labs: 


                                        





                                 04/05/19 11:05 





                                 04/05/19 11:05 





                                        











PT  12.6 SECONDS (9.7-12.2)  H  04/06/19  11:25    


 


INR  1.2   04/06/19  11:25    


 


APTT  35 SECONDS (21-34)  H  04/06/19  11:25

## 2019-04-08 NOTE — CP.PCM.CON
History of Present Illness





- History of Present Illness


History of Present Illness: 





Neurology consult dictated. 


Mr. Regalado is a 54 yr old male who has severe colitis, and a severe headache. 


He has a normal neurological exam. There is no photophobia, but nausea, and no 

vomiting. 


Plan; 


1. IV ttoradol


2. CT head


Dr. Sheppard


Neurology 











Past Patient History





- Infectious Disease


Hx of Infectious Diseases: None





- Past Medical History & Family History


Past Medical History?: Yes





- Past Social History


Smoking Status: Never Smoked





- HEENT


Hx Sinusitis: Yes





- ENDOCRINE/METABOLIC


Hx Diabetes Mellitus Type 2: Yes





- MUSCULOSKELETAL/RHEUMATOLOGICAL


Hx Back Pain: Yes


Hx Falls: No





- GASTROINTESTINAL


Hx Colitis: Yes





- PSYCHIATRIC


Hx Substance Use: No





- SURGICAL HISTORY


Hx Surgeries: Yes


Hx Herniorrhaphy: Yes (X1)





- ANESTHESIA


Hx Anesthesia: Yes


Hx Anesthesia Reactions: No


Hx Malignant Hyperthermia: No





Meds


Allergies/Adverse Reactions: 


                                    Allergies











Allergy/AdvReac Type Severity Reaction Status Date / Time


 


No Known Allergies Allergy   Verified 04/05/19 09:57














- Medications


Medications: 


                               Current Medications





Acetaminophen (Tylenol 325mg Tab)  650 mg PO Q6 PRN


   PRN Reason: Headache


   Last Admin: 04/06/19 12:48 Dose:  650 mg


Enoxaparin Sodium (Lovenox)  40 mg SC DAILY [facility]


   Last Admin: 04/07/19 09:17 Dose:  40 mg


Metronidazole (Flagyl)  500 mg in 100 mls @ 100 mls/hr IVPB Q8H [facility]; Protocol


   Last Admin: 04/07/19 20:22 Dose:  100 mls/hr


Sodium Chloride (Sodium Chloride 0.9%)  1,000 mls @ 100 mls/hr IV .Q10H [facility]


   Last Admin: 04/07/19 14:35 Dose:  Not Given


Ciprofloxacin (Cipro 400mg/200ml Dsw)  400 mg in 200 mls @ 133 mls/hr IVPB Q12H 

[facility]; Protocol


   Last Admin: 04/07/19 17:32 Dose:  133 mls/hr


Metoclopramide HCl (Reglan)  5 mg IVP Q6H [facility]


   Last Admin: 04/07/19 17:32 Dose:  5 mg


Ondansetron HCl (Zofran Inj)  4 mg IVP Q6 PRN


   PRN Reason: Nausea/Vomiting


   Last Admin: 04/07/19 14:30 Dose:  4 mg


Oxycodone/Acetaminophen (Percocet 5/325 Mg Tab)  1 tab PO Q4H PRN


   PRN Reason: Pain, severe (8-10)


   Stop: 04/09/19 18:20


   Last Admin: 04/07/19 14:27 Dose:  1 tab


Pantoprazole Sodium (Protonix Ec Tab)  40 mg PO DAILY [facility]


   Last Admin: 04/07/19 09:23 Dose:  40 mg


Sodium Chloride (Ayr Baby Saline 30 Ml)  0 ml ESTEFANY Q6H PRN


   PRN Reason: Nasal congestion


   Last Admin: 04/06/19 22:08 Dose:  1 spr











Results





- Vital Signs


Recent Vital Signs: 


                                Last Vital Signs











Temp  98.4 F   04/07/19 16:05


 


Pulse  69   04/07/19 16:05


 


Resp  20   04/07/19 16:05


 


BP  108/56 L  04/07/19 16:05


 


Pulse Ox  95   04/07/19 16:05














- Labs


Result Diagrams: 


                                 04/05/19 11:05





                                 04/05/19 11:05


Labs: 


                         Laboratory Results - last 24 hr











  04/07/19





  10:26


 


Urine Opiates Screen  Negative


 


Urine Methadone Screen  Negative


 


Ur Barbiturates Screen  Negative


 


Ur Phencyclidine Scrn  Negative


 


Ur Amphetamines Screen  Negative


 


U Benzodiazepines Scrn  Negative


 


U Oth Cocaine Metabols  Negative


 


U Cannabinoids Screen  Negative

## 2019-04-09 VITALS
DIASTOLIC BLOOD PRESSURE: 87 MMHG | TEMPERATURE: 97.8 F | SYSTOLIC BLOOD PRESSURE: 134 MMHG | HEART RATE: 73 BPM | OXYGEN SATURATION: 95 %

## 2019-04-09 PROCEDURE — 0DB58ZX EXCISION OF ESOPHAGUS, VIA NATURAL OR ARTIFICIAL OPENING ENDOSCOPIC, DIAGNOSTIC: ICD-10-PCS | Performed by: SPECIALIST

## 2019-04-09 PROCEDURE — 0DB68ZX EXCISION OF STOMACH, VIA NATURAL OR ARTIFICIAL OPENING ENDOSCOPIC, DIAGNOSTIC: ICD-10-PCS | Performed by: SPECIALIST

## 2019-04-09 RX ADMIN — CIPROFLOXACIN SCH MLS/HR: 2 INJECTION, SOLUTION INTRAVENOUS at 05:10

## 2019-04-09 RX ADMIN — ENOXAPARIN SODIUM SCH: 40 INJECTION SUBCUTANEOUS at 10:12

## 2019-04-09 RX ADMIN — WATER SCH MLS/HR: 1 INJECTION INTRAMUSCULAR; INTRAVENOUS; SUBCUTANEOUS at 03:07

## 2019-04-09 RX ADMIN — OXYCODONE HYDROCHLORIDE AND ACETAMINOPHEN PRN TAB: 5; 325 TABLET ORAL at 13:40

## 2019-04-09 RX ADMIN — OXYCODONE HYDROCHLORIDE AND ACETAMINOPHEN PRN TAB: 5; 325 TABLET ORAL at 00:07

## 2019-04-09 RX ADMIN — WATER SCH MLS/HR: 1 INJECTION INTRAMUSCULAR; INTRAVENOUS; SUBCUTANEOUS at 13:30

## 2019-04-09 RX ADMIN — PANTOPRAZOLE SODIUM SCH: 40 TABLET, DELAYED RELEASE ORAL at 10:12

## 2019-04-09 NOTE — CP.PCM.DIS
Provider





- Provider


Date of Admission: 


04/06/19 09:23





Attending physician: 


Edil Burgess MD





Consults: 








04/05/19 23:19


Gastroenterology Consult Routine 


   Comment: 


   Consulting Provider: Rona Shukla


   Consulting Physician: Rona Shukla


   Reason for Consult: pain





04/06/19 18:37


Neurology Consult Routine 


   Comment: 


   Consulting Provider: Autumn Sheppard


   Consulting Physician: Autumn Sheppard


   Reason for Consult: Severe headache











Time Spent in preparation of Discharge (in minutes): 30





Hospital Course





- Lab Results


Lab Results: 


                                  Micro Results





04/06/19 09:50   Stool   Stool Culture - Final


                            NO SALMONELLA, SHIGELLA OR CAMPYLOBACTER ISOLATED.





                             Most Recent Lab Values











WBC  8.2 K/uL (4.8-10.8)   04/08/19  13:40    


 


RBC  4.92 Mil/uL (4.40-5.90)   04/08/19  13:40    


 


Hgb  14.1 g/dL (12.0-18.0)  D 04/08/19  13:40    


 


Hct  43.1 % (35.0-51.0)   04/08/19  13:40    


 


MCV  87.7 fL (80.0-94.0)   04/08/19  13:40    


 


MCH  28.6 pg (27.0-31.0)   04/08/19  13:40    


 


MCHC  32.7 g/dL (33.0-37.0)  L  04/08/19  13:40    


 


RDW  13.5 % (11.5-14.5)   04/08/19  13:40    


 


Plt Count  260 K/uL (130-400)   04/08/19  13:40    


 


MPV  6.8 fL (7.2-11.7)  L  04/08/19  13:40    


 


Neut % (Auto)  72.4 % (50.0-75.0)   04/08/19  13:40    


 


Lymph % (Auto)  19.2 % (20.0-40.0)  L  04/08/19  13:40    


 


Mono % (Auto)  7.4 % (0.0-10.0)   04/08/19  13:40    


 


Eos % (Auto)  0.7 % (0.0-4.0)   04/08/19  13:40    


 


Baso % (Auto)  0.3 % (0.0-2.0)   04/08/19  13:40    


 


Neut # (Auto)  5.9 K/uL (1.8-7.0)   04/08/19  13:40    


 


Lymph # (Auto)  1.6 K/uL (1.0-4.3)   04/08/19  13:40    


 


Mono # (Auto)  0.6 K/uL (0.0-0.8)   04/08/19  13:40    


 


Eos # (Auto)  0.1 K/uL (0.0-0.7)   04/08/19  13:40    


 


Baso # (Auto)  0.0 K/uL (0.0-0.2)   04/08/19  13:40    


 


Neutrophils % (Manual)  87 % (50-75)  H  04/05/19  11:05    


 


Lymphocytes % (Manual)  8 % (20-40)  L  04/05/19  11:05    


 


Monocytes % (Manual)  4 % (0-10)   04/05/19  11:05    


 


Eosinophils % (Manual)  1 % (0-4)   04/05/19  11:05    


 


Platelet Estimate  Normal  (NORMAL)   04/05/19  11:05    


 


PT  13.8 SECONDS (9.7-12.2)  H  04/08/19  13:40    


 


INR  1.3   04/08/19  13:40    


 


APTT  37 SECONDS (21-34)  H  04/08/19  13:40    


 


Sodium  134 mmol/L (132-148)   04/08/19  13:40    


 


Potassium  4.5 mmol/L (3.6-5.2)   04/08/19  13:40    


 


Chloride  100 mmol/L ()   04/08/19  13:40    


 


Carbon Dioxide  27 mmol/L (22-30)   04/08/19  13:40    


 


Anion Gap  11  (10-20)   04/08/19  13:40    


 


BUN  8 mg/dL (9-20)  L  04/08/19  13:40    


 


Creatinine  0.9 mg/dL (0.8-1.5)   04/08/19  13:40    


 


Est GFR ( Amer)  > 60   04/08/19  13:40    


 


Est GFR (Non-Af Amer)  > 60   04/08/19  13:40    


 


Random Glucose  176 mg/dL ()  H D 04/08/19  13:40    


 


Calcium  8.6 mg/dl (8.6-10.4)   04/08/19  13:40    


 


Total Bilirubin  0.4 mg/dL (0.2-1.3)   04/08/19  13:40    


 


AST  32 U/L (17-59)   04/08/19  13:40    


 


ALT  29 U/L (21-72)   04/08/19  13:40    


 


Alkaline Phosphatase  45 U/L ()   04/08/19  13:40    


 


Total Protein  6.4 g/dL (6.3-8.3)   04/08/19  13:40    


 


Albumin  3.7 g/dL (3.5-5.0)   04/08/19  13:40    


 


Globulin  2.7 gm/dL (2.2-3.9)   04/08/19  13:40    


 


Albumin/Globulin Ratio  1.4  (1.0-2.1)   04/08/19  13:40    


 


Lipase  104 U/L ()   04/05/19  11:05    


 


Carcinoembryonic Ag  0.8 ng/mL (0-3.0)   04/06/19  11:25    


 


Urine Color  Yellow  (YELLOW)   04/05/19  11:05    


 


Urine Clarity  Hazy  (Clear)   04/05/19  11:05    


 


Urine pH  8.0  (5.0-8.0)   04/05/19  11:05    


 


Ur Specific Gravity  1.021  (1.003-1.030)   04/05/19  11:05    


 


Urine Protein  1+ mg/dL (NEGATIVE)  H  04/05/19  11:05    


 


Urine Glucose (UA)  Normal mg/dL (Normal)   04/05/19  11:05    


 


Urine Ketones  1+ mg/dL (NEGATIVE)  H  04/05/19  11:05    


 


Urine Blood  Negative  (NEGATIVE)   04/05/19  11:05    


 


Urine Nitrate  Negative  (NEGATIVE)   04/05/19  11:05    


 


Urine Bilirubin  Negative  (NEGATIVE)   04/05/19  11:05    


 


Urine Urobilinogen  Normal mg/dL (0.2-1.0)   04/05/19  11:05    


 


Ur Leukocyte Esterase  Neg Maya/uL (Negative)   04/05/19  11:05    


 


Urine WBC (Auto)  2 /hpf (0-5)   04/05/19  11:05    


 


Urine RBC (Auto)  < 1 /hpf (0-3)   04/05/19  11:05    


 


Ur Squamous Epith Cells  5 /hpf (0-5)   04/05/19  11:05    


 


Stool Leukocytes, Qual  Negative  (NEGATIVE)   04/05/19  09:50    


 


Urine Opiates Screen  Negative  (NEGATIVE)   04/07/19  10:26    


 


Urine Methadone Screen  Negative  (NEGATIVE)   04/07/19  10:26    


 


Ur Barbiturates Screen  Negative  (NEGATIVE)   04/07/19  10:26    


 


Ur Phencyclidine Scrn  Negative  (NEGATIVE)   04/07/19  10:26    


 


Ur Amphetamines Screen  Negative  (NEGATIVE)   04/07/19  10:26    


 


U Benzodiazepines Scrn  Negative  (NEGATIVE)   04/07/19  10:26    


 


U Oth Cocaine Metabols  Negative  (NEGATIVE)   04/07/19  10:26    


 


U Cannabinoids Screen  Negative  (NEGATIVE)   04/07/19  10:26    














Discharge Exam





- Head Exam


Head Exam: ATRAUMATIC, NORMAL INSPECTION, NORMOCEPHALIC





Discharge Plan





- Discharge Medications


Prescriptions: 


Hydrocortisone 2.5% (Rectal) [Anusol-HC] 30 applic AL BID #1 tube


Sucralfate [Carafate Tab] 1 gm PO ACTID 30 Days  tab


Metronidazole [Flagyl] 500 mg PO Q8H 7 Days #21 tablet





- Follow Up Plan


Condition: GOOD


Disposition: HOME/ ROUTINE


Instructions:  Hydrocortisone (Topical), Metronidazole (Systemic), Sucralfate, 

Colitis (DC)


Referrals: 


Edil Burgess MD [Staff Provider] -

## 2019-04-09 NOTE — CP.PCM.PN
Subjective





- Date & Time of Evaluation


Date of Evaluation: 04/09/19


Time of Evaluation: 17:33





- Subjective


Subjective: 





alert and orientedx3, denies any pain or acute distress.





Objective





- Vital Signs/Intake and Output


Vital Signs (last 24 hours): 


                                        











Temp Pulse Resp BP Pulse Ox


 


 97.8 F   73   20   134/87   95 


 


 04/09/19 15:00  04/09/19 15:00  04/09/19 15:00  04/09/19 15:00  04/09/19 15:00








Intake and Output: 


                                        











 04/09/19 04/09/19





 06:59 18:59


 


Intake Total 2180 


 


Balance 2180 














- Labs


Labs: 


                                        





                                 04/08/19 13:40 





                                 04/08/19 13:40 





                                        











PT  13.8 SECONDS (9.7-12.2)  H  04/08/19  13:40    


 


INR  1.3   04/08/19  13:40    


 


APTT  37 SECONDS (21-34)  H  04/08/19  13:40    














Assessment and Plan





- Assessment and Plan (Free Text)


Assessment: 





54 year old male admitted with abdominal pain, seen and examined


s/p Endoscopy today, no acute bleeding or distress.


cleared by GI, discussed with DR Burgess, plan to discharge home today.


Advised to follow up with PMD in 1 week


F/u with Gi Dr. Shukla in 1-2 weeks


Carafate/Flagyl to continue


Advance diet slowly, avoid spicy food, alcohol, citric etc....

## 2019-04-10 NOTE — DS
DISCHARGE DIAGNOSES:

1.  Nonspecific colitis.

2.  Dehydration.

3.  Intractable headache.



HOSPITAL COURSE:  This is a 54-year-old  male with no significant

past medical history.  For two months, he has diarrhea, loose watery stool,

fever, chills, abdominal pain.  The patient was admitted to the floor,

started on Cipro, Flagyl, and GI was consulted.  The patient was found to

have colitis in transverse and ascending colon.  Biopsy is pending.  The

patient is feeling better on Cipro, Flagyl, and he is for discharge. 

Condition upon discharge is stable.  He had intractable headache in the

course of hospitalization.  Neurology was consulted, and the patient's MRI

of the brain was negative.



PHYSICAL EXAMINATION:

VITAL SIGNS:  Blood pressure 134/87, pulse 73, respiratory rate 20, and

temperature 97.8.

LUNGS:  Clear.

ABDOMEN:  Soft and nontender.  Bowel sounds are positive.



PLAN:  Discharge the patient.  Outpatient followup.





__________________________________________

Edil Burgess MD





DD:  04/09/2019 22:36:14

DT:  04/10/2019 1:34:48

Job # [id number]